# Patient Record
Sex: MALE | Race: WHITE | HISPANIC OR LATINO | Employment: STUDENT | URBAN - METROPOLITAN AREA
[De-identification: names, ages, dates, MRNs, and addresses within clinical notes are randomized per-mention and may not be internally consistent; named-entity substitution may affect disease eponyms.]

---
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
## 2020-01-02 ENCOUNTER — HOSPITAL ENCOUNTER (OUTPATIENT)
Dept: RADIOLOGY | Facility: HOSPITAL | Age: 20
Discharge: HOME | End: 2020-01-02
Attending: PHYSICIAN ASSISTANT
Payer: COMMERCIAL

## 2020-01-02 ENCOUNTER — OFFICE VISIT (OUTPATIENT)
Dept: SPORTS MEDICINE | Facility: CLINIC | Age: 20
End: 2020-01-02
Payer: COMMERCIAL

## 2020-01-02 ENCOUNTER — CLINICAL SUPPORT (OUTPATIENT)
Dept: REHABILITATION | Facility: HOSPITAL | Age: 20
End: 2020-01-02
Attending: ORTHOPAEDIC SURGERY
Payer: COMMERCIAL

## 2020-01-02 VITALS
DIASTOLIC BLOOD PRESSURE: 72 MMHG | HEART RATE: 71 BPM | HEIGHT: 70 IN | WEIGHT: 285 LBS | SYSTOLIC BLOOD PRESSURE: 117 MMHG | BODY MASS INDEX: 40.8 KG/M2

## 2020-01-02 DIAGNOSIS — Z98.890 S/P ACL RECONSTRUCTION: Primary | ICD-10-CM

## 2020-01-02 DIAGNOSIS — M25.562 ACUTE PAIN OF LEFT KNEE: Primary | ICD-10-CM

## 2020-01-02 DIAGNOSIS — M25.562 LEFT KNEE PAIN, UNSPECIFIED CHRONICITY: ICD-10-CM

## 2020-01-02 PROCEDURE — 97110 THERAPEUTIC EXERCISES: CPT | Performed by: PHYSICAL THERAPIST

## 2020-01-02 PROCEDURE — 99999 PR PBB SHADOW E&M-EST. PATIENT-LVL III: ICD-10-PCS | Mod: PBBFAC,,, | Performed by: PHYSICIAN ASSISTANT

## 2020-01-02 PROCEDURE — 99999 PR PBB SHADOW E&M-EST. PATIENT-LVL III: CPT | Mod: PBBFAC,,, | Performed by: PHYSICIAN ASSISTANT

## 2020-01-02 PROCEDURE — 73560 X-RAY EXAM OF KNEE 1 OR 2: CPT | Mod: TC,LT

## 2020-01-02 PROCEDURE — 73560 X-RAY EXAM OF KNEE 1 OR 2: CPT | Mod: 26,LT,, | Performed by: RADIOLOGY

## 2020-01-02 PROCEDURE — 99024 PR POST-OP FOLLOW-UP VISIT: ICD-10-PCS | Mod: S$GLB,,, | Performed by: PHYSICIAN ASSISTANT

## 2020-01-02 PROCEDURE — 99024 POSTOP FOLLOW-UP VISIT: CPT | Mod: S$GLB,,, | Performed by: PHYSICIAN ASSISTANT

## 2020-01-02 PROCEDURE — 73560 XR KNEE 1 OR 2 VIEW LEFT: ICD-10-PCS | Mod: 26,LT,, | Performed by: RADIOLOGY

## 2020-01-02 NOTE — PROGRESS NOTES
Physical Therapy Daily Treatment Note     Visit Date: 1/2/2020    Name: Ba Celestin  Clinic Number: [id number]    Therapy Diagnosis:  Pain left knee   Physician: Grant Membreno MD    PROCEDURES(S) PERFORMED:   1. Left  Arthroscopy, anterior cruciate ligament reconstruction 56879   2. Left  Extra-Articular ligament reconstruction (ALL), 17854  3.  Left  Arthroscopy, with meniscus repair (medial OR lateral) 25027, Lateral  4.  Left  Arthroscopy, knee, synovectomy, limited 21893    GRAFT SOURCE:  BPTB     MTF Medium cortical fibers and MTF semitendinosis allograft Looped 6 mm x 130mm (Fresh Frozen)     three Mitek Truespan meniscal sutures were applied in vertical and horizontal fashion to stabilize the tear       DATE OF PROCEDURE: 12/20/2019       Evaluation Date: 12/23/2019  Authorization Period Expiration: 12/31/2019  Plan of Care Certification Period: 8/23/2020  Visit # / Visits authorized: 3/ 1    Time In:   10:00  Time Out: 11:00  Total Billable Time: 45  minutes    Precautions: Standard + PHYSICAL THERAPY:  The patient should begin physical therapy on postoperative   day # 3 and will be advanced to outpatient therapy as soon as   Possible following discharge.  Weight bearing:toe touch weight bearing  left leg  Range of Motion:limited to -10 degrees extension and 90 degrees flexion     No CPM required due to procedure performed      to begin quad sets with a heel roll to obtain hyperextension, straight leg raise and heel slides with the heel supported in a closed-chain fashion     Immediate specific exercises should include:   Gait program should include the above stated weightbearing; in addition: protected gait following symptoms of pain and swelling     Immobilizer if present should be locked @ -10 degrees with gait and allowed to flex to 90 degrees at rest.       Subjective      Pt reports improvement in his pain level    he was compliant with home exercise program given last session.   Response to  previous treatment:good  Functional change: pt limited by healing constraints     Pain: 0/10 (decreased)  Location: left knee      Objective     PO 13    Measurements taken:  PROM: 0/0/70      Ba received therapeutic exercises to develop strength, endurance, ROM and flexibility for 45  minutes including:    Grade I-II patellar mobilization   HSS 5x:15  QS 2x10:10  LLR in brace 2x10:05   ALR in brace 2x10:05   Supine SLR AA 2x10 in brace  Self ROM k' flex 5x;15 EOT  PROM k' flex EOT 3xs  PROM k' flex EOT 3xs  supine ext hang 3'x2 0#    CP x 10'       Home Exercises Provided and Patient Education Provided     Education provided:   - reiterated WB'ing and ROM precautions     Written Home Exercises Provided: Patient instructed to cont prior HEP.  Exercises were reviewed and Ba was able to demonstrate them prior to the end of the session.  Ba demonstrated good  understanding of the education provided.     See EMR under hand out  for exercises provided prior visit.      Assessment     naomi Rx without increase in sxs, continued difficulty with quad activation despite pain level decreasing and inability to perform SLR       Ba is progressing well towards his goals.   Pt prognosis is Good.     Pt will continue to benefit from skilled outpatient physical therapy to address the deficits listed in the problem list box on initial evaluation, provide pt/family education and to maximize pt's level of independence in the home and community environment.     Pt's spiritual, cultural and educational needs considered and pt agreeable to plan of care and goals.    Anticipated barriers to physical therapy: none    Goals:     Plan     Continue with established Plan of Care towards PT goals with focus on decreasing pain, increasing ROM, strength, neuromuscular control and functional status       Sav Ibarra, PT

## 2020-01-02 NOTE — PROGRESS NOTES
Subjective:          Chief Complaint: Ba Celestin is a 19 y.o. male who had concerns including Pain of the Left Knee.    HPI   Patient presents to clinic for 2 week post op evaluation of left knee. Pain today is 0/10. Denies nausea, vomiting, fever, chills, CP, and SOB. He is no longer taking pain medication. He is currently ambulating with T-scope brace in place and set to 90 degrees. He is 25% WB with crutches. He has been attending formal PT at [facility] with Sav Ibarra. Patient is leaving to go back to college in a week.      DATE OF PROCEDURE: 12/20/2019     ATTENDING SURGEON: Surgeon(s) and Role:     * Grant Membreno MD - Primary     Assistants:  MD Celestino Villa PA-C        PREOPERATIVE DIAGNOSIS:  Left  Synovitis M65.9, Tear, Lateral meniscus, acute S83.289A and Anterior Cruciate Ligament Tear S83.510     POSTOPERATIVE DIAGNOSIS:   Left  Synovitis M65.9, Tear, Lateral meniscus, acute S83.289A and Anterior Cruciate Ligament Tear S83.510     PROCEDURES(S) PERFORMED:   1. Left  Arthroscopy, anterior cruciate ligament reconstruction 95501   2. Left  Extra-Articular ligament reconstruction (ALL), 10066  3.  Left  Arthroscopy, with meniscus repair (medial OR lateral) 32061, Lateral  4.  Left  Arthroscopy, knee, synovectomy, limited 09516    Review of Systems   Constitution: Negative. Negative for chills, fever, weight gain and weight loss.   HENT: Negative for congestion and sore throat.    Eyes: Negative for blurred vision and double vision.   Cardiovascular: Negative for chest pain, leg swelling and palpitations.   Respiratory: Negative for cough and shortness of breath.    Hematologic/Lymphatic: Does not bruise/bleed easily.   Skin: Negative for itching, poor wound healing and rash.   Musculoskeletal: Positive for joint pain, joint swelling and stiffness. Negative for back pain, muscle weakness and myalgias.   Gastrointestinal: Negative for abdominal pain, constipation, diarrhea,  nausea and vomiting.   Genitourinary: Negative.  Negative for frequency and hematuria.   Neurological: Negative for dizziness, headaches, numbness, paresthesias and sensory change.   Psychiatric/Behavioral: Negative for altered mental status and depression. The patient is not nervous/anxious.    Allergic/Immunologic: Negative for hives.       Pain Related Questions  Over the past 3 days, what was your average pain during activity? (I.e. running, jogging, walking, climbing stairs, getting dressed, ect.): 4  Over the past 3 days, what was your highest pain level?: 4  Over the past 3 days, what was your lowest pain level? : 0    Other  Was the patient's HEIGHT measured or patient reported?: Patient Reported  Was the patient's WEIGHT measured or patient reported?: Measured      Objective:        General: Ba is well-developed, well-nourished, appears stated age, in no acute distress, alert and oriented to time, place and person.     General    Vitals reviewed.  Constitutional: He is oriented to person, place, and time. He appears well-developed and well-nourished. No distress.   Neck: Normal range of motion.   Cardiovascular: Normal rate and regular rhythm.    Pulmonary/Chest: Effort normal. No respiratory distress.   Neurological: He is alert and oriented to person, place, and time.   Psychiatric: He has a normal mood and affect. His behavior is normal. Thought content normal.     General Musculoskeletal Exam   Gait: antalgic and abnormal         Left Knee Exam     Inspection   Erythema: absent  Scars: present  Swelling: present  Effusion: present  Deformity: absent  Bruising: absent    Tenderness   The patient is experiencing no tenderness.     Range of Motion   Extension:  0 normal   Flexion:  50 abnormal     Other   Sensation: normal    Comments:  Sutures tags trimmed. No signs of infection or necrosis. No purulent drainage. NVI.    RADIOGRAPHS:  Left knee:  FINDINGS:  Postoperative changes are now identified  relating to an interval left anterior cruciate ligament reconstruction procedure.  Osseous structures are otherwise unremarkable.  No evidence of recent fracture or lytic destructive process.  No unusual postoperative findings or significant detrimental interval change since the preoperative examination of 12/18/2019 appreciated.      Assessment:       Encounter Diagnoses   Name Primary?    Left knee pain, unspecified chronicity     S/P ACL reconstruction Yes          Plan:       1. Provided patient with operative note.  2. Removed portal suture and suture tags trimmed. NO signs of infection.   3. Continue  mg once a day.  4 Continue PT per protocol.  5. RTC in 1 weeks with Dr. Grant Membreno for 3week post op appt. Prior to patient leaving to go back to school.   6. PHYSICAL THERAPY:  The patient should begin physical therapy on postoperative   day # 3 and will be advanced to outpatient therapy as soon as   Possible following discharge.  Weight bearing:toe touch weight bearing  left leg  Range of Motion:limited to -10 degrees extension and 90 degrees flexion                    Patient questionnaires may have been collected.

## 2020-01-07 ENCOUNTER — CLINICAL SUPPORT (OUTPATIENT)
Dept: REHABILITATION | Facility: HOSPITAL | Age: 20
End: 2020-01-07
Attending: ORTHOPAEDIC SURGERY
Payer: COMMERCIAL

## 2020-01-07 DIAGNOSIS — M25.562 ACUTE PAIN OF LEFT KNEE: Primary | ICD-10-CM

## 2020-01-07 PROCEDURE — 97110 THERAPEUTIC EXERCISES: CPT | Performed by: PHYSICAL THERAPIST

## 2020-01-07 NOTE — PROGRESS NOTES
Physical Therapy Daily Treatment Note     Visit Date: 1/7/2020    Name: Ba Celestin  Clinic Number: [id number]    Therapy Diagnosis:  Pain left knee   Physician: Grant Membreno MD    PROCEDURES(S) PERFORMED:   1. Left  Arthroscopy, anterior cruciate ligament reconstruction 71533   2. Left  Extra-Articular ligament reconstruction (ALL), 92562  3.  Left  Arthroscopy, with meniscus repair (medial OR lateral) 54288, Lateral  4.  Left  Arthroscopy, knee, synovectomy, limited 07055    GRAFT SOURCE:  BPTB     MTF Medium cortical fibers and MTF semitendinosis allograft Looped 6 mm x 130mm (Fresh Frozen)     three Mitek Truespan meniscal sutures were applied in vertical and horizontal fashion to stabilize the tear       DATE OF PROCEDURE: 12/20/2019       Evaluation Date: 12/23/2019  Authorization Period Expiration: 12/31/2019  Plan of Care Certification Period: 8/23/2020  Visit # / Visits authorized: 4/ 1    Time In:   9:00  Time Out: 10:00  Total Billable Time: 30  minutes    Precautions: Standard + PHYSICAL THERAPY:  The patient should begin physical therapy on postoperative   day # 3 and will be advanced to outpatient therapy as soon as   Possible following discharge.  Weight bearing:toe touch weight bearing  left leg  Range of Motion:limited to -10 degrees extension and 90 degrees flexion     No CPM required due to procedure performed      to begin quad sets with a heel roll to obtain hyperextension, straight leg raise and heel slides with the heel supported in a closed-chain fashion     Immediate specific exercises should include:   Gait program should include the above stated weightbearing; in addition: protected gait following symptoms of pain and swelling     Immobilizer if present should be locked @ -10 degrees with gait and allowed to flex to 90 degrees at rest.       Subjective      Pt reports continued difficulty with ROM k' flexion     he was compliant with home exercise program given last session.    Response to previous treatment:good  Functional change: pt limited by healing constraints     Pain: 0/10 (decreased)  Location: left knee      Objective     PO 18    Measurements taken:  PROM: 2/0/75 (increased)    Ba received therapeutic exercises to develop strength, endurance, ROM and flexibility for 30  minutes including:    Grade I-II patellar mobilization   HSS 5x:15  QS 2x10:10  LLR in brace 2x10:05   ALR in brace 2x10:05   Supine SLR 2x10:05 in brace  Self ROM k' flex 5x;15 EOT  PROM k' flex EOT 3xs  supine k' flex EOT hang  3'x2  supine ext hang 3'x2 0#    CP x 10'       Home Exercises Provided and Patient Education Provided     Education provided:   - reiterated WB'ing and ROM precautions     Written Home Exercises Provided: Patient instructed to cont prior HEP.  Exercises were reviewed and Ba was able to demonstrate them prior to the end of the session.  Ba demonstrated good  understanding of the education provided.     See EMR under hand out  for exercises provided prior visit.      Assessment     naomi Rx without increase in sxs, slight improvement in quad activation and PROM k' flex        Ba is progressing well towards his goals.   Pt prognosis is Good.     Pt will continue to benefit from skilled outpatient physical therapy to address the deficits listed in the problem list box on initial evaluation, provide pt/family education and to maximize pt's level of independence in the home and community environment.     Pt's spiritual, cultural and educational needs considered and pt agreeable to plan of care and goals.    Anticipated barriers to physical therapy: none    Goals:     Plan     Continue with established Plan of Care towards PT goals with focus on decreasing pain, increasing ROM, strength, neuromuscular control and functional status       Sav Ibarra, PT

## 2020-01-08 ENCOUNTER — OFFICE VISIT (OUTPATIENT)
Dept: SPORTS MEDICINE | Facility: CLINIC | Age: 20
End: 2020-01-08
Payer: COMMERCIAL

## 2020-01-08 VITALS — HEART RATE: 57 BPM | DIASTOLIC BLOOD PRESSURE: 71 MMHG | SYSTOLIC BLOOD PRESSURE: 127 MMHG

## 2020-01-08 DIAGNOSIS — M25.562 ACUTE PAIN OF LEFT KNEE: ICD-10-CM

## 2020-01-08 DIAGNOSIS — Z98.890 STATUS POST RECONSTRUCTION OF ANTERIOR CRUCIATE LIGAMENT: ICD-10-CM

## 2020-01-08 DIAGNOSIS — S83.512D RUPTURE OF ANTERIOR CRUCIATE LIGAMENT OF LEFT KNEE, SUBSEQUENT ENCOUNTER: Primary | ICD-10-CM

## 2020-01-08 PROCEDURE — 99024 POSTOP FOLLOW-UP VISIT: CPT | Mod: S$GLB,,, | Performed by: ORTHOPAEDIC SURGERY

## 2020-01-08 PROCEDURE — 99999 PR PBB SHADOW E&M-EST. PATIENT-LVL III: CPT | Mod: PBBFAC,,, | Performed by: ORTHOPAEDIC SURGERY

## 2020-01-08 PROCEDURE — 99999 PR PBB SHADOW E&M-EST. PATIENT-LVL III: ICD-10-PCS | Mod: PBBFAC,,, | Performed by: ORTHOPAEDIC SURGERY

## 2020-01-08 PROCEDURE — 99024 PR POST-OP FOLLOW-UP VISIT: ICD-10-PCS | Mod: S$GLB,,, | Performed by: ORTHOPAEDIC SURGERY

## 2020-01-08 NOTE — PATIENT INSTRUCTIONS
25 % PWB x 2 weeks then 50% PWB x 1 week then WBAT at 4-6 weeks after surgery    ROM:  AAROM /PROM 0-90 degrees now then at 4 weeks 100 degrees and 6 weeks 120 degrees as tolerated  Avoid forced flexion for 3 months    Discontinue immobilizer at 6 weeks    No running or jumping for 3-4 months    Low impact x 4 months    Advance CORE program at 4-6 weeks    Take celebrex 200 mg BID and one Aspirin per day  Use pain medication as needed with therapy

## 2020-01-09 ENCOUNTER — CLINICAL SUPPORT (OUTPATIENT)
Dept: REHABILITATION | Facility: HOSPITAL | Age: 20
End: 2020-01-09
Attending: ORTHOPAEDIC SURGERY
Payer: COMMERCIAL

## 2020-01-09 DIAGNOSIS — M25.562 ACUTE PAIN OF LEFT KNEE: Primary | ICD-10-CM

## 2020-01-09 PROCEDURE — 97110 THERAPEUTIC EXERCISES: CPT | Performed by: PHYSICAL THERAPIST

## 2020-01-09 NOTE — PROGRESS NOTES
Physical Therapy Daily Treatment Note     Visit Date: 1/9/2020    Name: Ba Celestin  Clinic Number: [id number]    Therapy Diagnosis:  Pain left knee   Physician: Grant Membreno MD    PROCEDURES(S) PERFORMED:   1. Left  Arthroscopy, anterior cruciate ligament reconstruction 65431   2. Left  Extra-Articular ligament reconstruction (ALL), 28455  3.  Left  Arthroscopy, with meniscus repair (medial OR lateral) 27233, Lateral  4.  Left  Arthroscopy, knee, synovectomy, limited 31961    GRAFT SOURCE:  BPTB     MTF Medium cortical fibers and MTF semitendinosis allograft Looped 6 mm x 130mm (Fresh Frozen)     three Mitek Truespan meniscal sutures were applied in vertical and horizontal fashion to stabilize the tear       DATE OF PROCEDURE: 12/20/2019       Evaluation Date: 12/23/2019  Authorization Period Expiration: 12/31/2019  Plan of Care Certification Period: 8/23/2020  Visit # / Visits authorized: 5/ 1    Time In:   11:00  Time Out: 12:00  Total Billable Time: 45  minutes    Precautions: Standard + PHYSICAL THERAPY:  The patient should begin physical therapy on postoperative   day # 3 and will be advanced to outpatient therapy as soon as   Possible following discharge.  Weight bearing:toe touch weight bearing  left leg  Range of Motion:limited to -10 degrees extension and 90 degrees flexion     No CPM required due to procedure performed      to begin quad sets with a heel roll to obtain hyperextension, straight leg raise and heel slides with the heel supported in a closed-chain fashion     Immediate specific exercises should include:   Gait program should include the above stated weightbearing; in addition: protected gait following symptoms of pain and swelling     Immobilizer if present should be locked @ -10 degrees with gait and allowed to flex to 90 degrees at rest.       Subjective      Pt reports that when he had his follow up with Dr. Membreno, he had a significant amount of pain just hanging the leg in k ' flex  over EOT    he was compliant with home exercise program given last session.   Response to previous treatment:good  Functional change: pt limited by healing constraints     Pain: 0/10 at rest this AM  Location: left knee      Objective     PO 20    Measurements taken:  PROM: 2/0/75 (increased)    Ba received therapeutic exercises to develop strength, endurance, ROM and flexibility for 45  minutes including:    Grade II-III patellar mobilization   HSS 5x:15  QS + NMES x 10'   Supine SLR x 10' + NMES   Self ROM k' flex 5x;15 EOT  PROM k' flex EOT 3xs  prone ext hang 3'x2 0#  PROM k' ext 3xs    CP x 10'       Home Exercises Provided and Patient Education Provided     Education provided:   - reiterated WB'ing and ROM precautions     Written Home Exercises Provided: Patient instructed to cont prior HEP.  Exercises were reviewed and Ba was able to demonstrate them prior to the end of the session.  Ba demonstrated good  understanding of the education provided.     See EMR under hand out  for exercises provided prior visit.      Assessment     naomi Rx without increase in sxs   Able to lift LE during SLR with brace but demonstrates quad lag even in brace   Pt also continues to have limitation in k' flex ROM     Ba is progressing well towards his goals.   Pt prognosis is Good.     Pt will continue to benefit from skilled outpatient physical therapy to address the deficits listed in the problem list box on initial evaluation, provide pt/family education and to maximize pt's level of independence in the home and community environment.     Pt's spiritual, cultural and educational needs considered and pt agreeable to plan of care and goals.    Anticipated barriers to physical therapy: none    Goals:     Short-Term Goals: 6  Weeks (moving toward achievement)  - The patient will be independent with initial home exercise program.  - The patient is independent with donning/doffing brace to protect tissue healing.  -  The patient will be independent amb with crutches on level tile for household distances.  - The patient will increase ROM by 15 degrees to perform ambulation and bathing and hygiene with pain < 0/10.  - The patient will increase strength by 1/2 muscle grade to perform ambulation and bathing and hygiene with pain < 0/10.    Long-Term Goals: 36 weeks  - The patient will be independent with home exercise program and symptom management.  - The patient will be independent amb with no assistive device on all surfaces for community distances.  - The patient will increase ROM = to uninvolved knee to perform ambulation, toileting, dressing and recreation/leisure activities  with pain < 0/10.  - The patient will increase strength = to uninvolved knee  to perform ambulation, toileting, dressing and recreation/leisure activities   with pain < 0/10.      Plan     DC secondary to pt returning to college, PT to contact ATC at school to discuss rehab progression, pt to return to formal care at our facility at either spring break or during summer     Sav Ibarra, PT

## 2020-02-05 ENCOUNTER — TELEPHONE (OUTPATIENT)
Dept: SPORTS MEDICINE | Facility: CLINIC | Age: 20
End: 2020-02-05

## 2020-02-05 NOTE — TELEPHONE ENCOUNTER
Spoke to the patient and rescheduled appt to 2/10 at 8:15a  ----- Message from Kimberly Dao sent at 2/5/2020  4:46 PM CST -----  Contact: self  Pt states he was not able to have his MRI completed today Pt ask for a call to reschedule his MRI and his post op appointment     Contact info  [phone]

## 2020-02-05 NOTE — TELEPHONE ENCOUNTER
Spoke to the patient. Schedule an appt for 2/10 at 9:45a  ----- Message from Marie Hanley sent at 2/5/2020 12:46 PM CST -----  Contact: pt   Please call pt at [phone]     Patient has re-injured his left knee and need an urgent appt    Patient is available on Monday 02/10/20    Thank you

## 2020-02-07 NOTE — PROGRESS NOTES
Subjective:          Chief Complaint: Ba Celestin is a 19 y.o. male who had concerns including Pain of the Left Knee.    HPI   Ba Celestin is a 19 y.o. male is here for left knee evaluation.  He left ACL reconstruction, ALL reconstruction, and meniscus repair 12/2019.  He was doing well until 1 week ago when he slipped on ice while back at school in [place].  His right leg slipped and he fell onto a flexed left knee.  He noticed immediate pain, swelling, effusion, and ecchymoses.  He was seen at ED in [place] at the time where xrays were performed.  He was been in his HKB locked in extension and NWB since.    DATE OF PROCEDURE: 12/20/2019     ATTENDING SURGEON: Surgeon(s) and Role:     * Grant Membreno MD - Primary     Assistants:  MD Celestino Villa PA-C        PREOPERATIVE DIAGNOSIS:  Left  Synovitis M65.9, Tear, Lateral meniscus, acute S83.289A and Anterior Cruciate Ligament Tear S83.510     POSTOPERATIVE DIAGNOSIS:   Left  Synovitis M65.9, Tear, Lateral meniscus, acute S83.289A and Anterior Cruciate Ligament Tear S83.510     PROCEDURES(S) PERFORMED:   1. Left  Arthroscopy, anterior cruciate ligament reconstruction 19844   2. Left  Extra-Articular ligament reconstruction (ALL), 18860  3.  Left  Arthroscopy, with meniscus repair (medial OR lateral) 05404, Lateral  4.  Left  Arthroscopy, knee, synovectomy, limited 41986    Review of Systems   Constitution: Negative. Negative for chills, fever, weight gain and weight loss.   HENT: Negative for congestion and sore throat.    Eyes: Negative for blurred vision and double vision.   Cardiovascular: Negative for chest pain, leg swelling and palpitations.   Respiratory: Negative for cough and shortness of breath.    Hematologic/Lymphatic: Does not bruise/bleed easily.   Skin: Positive for color change. Negative for itching, poor wound healing and rash.   Musculoskeletal: Positive for joint pain, joint swelling and stiffness. Negative for back pain,  muscle weakness and myalgias.   Gastrointestinal: Negative for abdominal pain, constipation, diarrhea, nausea and vomiting.   Genitourinary: Negative.  Negative for frequency and hematuria.   Neurological: Negative for dizziness, headaches, numbness, paresthesias and sensory change.   Psychiatric/Behavioral: Negative for altered mental status and depression. The patient is not nervous/anxious.    Allergic/Immunologic: Negative for hives.       Pain Related Questions  Over the past 3 days, what was your average pain during activity? (I.e. running, jogging, walking, climbing stairs, getting dressed, ect.): 1  Over the past 3 days, what was your highest pain level?: 2  Over the past 3 days, what was your lowest pain level? : 0    Other  How many nights a week are you awakened by your affected body part?: 0  Was the patient's HEIGHT measured or patient reported?: Patient Reported  Was the patient's WEIGHT measured or patient reported?: Measured      Objective:        General: Ba is well-developed, well-nourished, appears stated age, in no acute distress, alert and oriented to time, place and person.     General    Vitals reviewed.  Constitutional: He is oriented to person, place, and time. He appears well-developed and well-nourished. No distress.   HENT:   Mouth/Throat: No oropharyngeal exudate.   Eyes: Right eye exhibits no discharge. Left eye exhibits no discharge.   Neck: Normal range of motion.   Cardiovascular: Normal rate and regular rhythm.    Pulmonary/Chest: Effort normal and breath sounds normal. No respiratory distress.   Neurological: He is alert and oriented to person, place, and time. He has normal reflexes. No cranial nerve deficit. Coordination normal.   Psychiatric: He has a normal mood and affect. His behavior is normal. Judgment and thought content normal.     General Musculoskeletal Exam   Gait: antalgic and abnormal       Right Knee Exam     Inspection   Erythema: absent  Scars:  absent  Swelling: absent  Effusion: absent  Deformity: absent  Bruising: absent    Tenderness   The patient is experiencing no tenderness.     Range of Motion   Extension: 0   Flexion: 140     Tests   Meniscus   Lynn:  Medial - negative Lateral - negative  Ligament Examination Lachman: normal (-1 to 2mm) PCL-Posterior Drawer: normal (0 to 2mm)     MCL - Valgus: normal (0 to 2mm)  LCL - Varus: normalPivot Shift: normal (Equal)Reverse Pivot Shift: normal (Equal)Dial Test at 30 degrees: normal (< 5 degrees)Dial Test at 90 degrees: normal (< 5 degrees)  Posterior Sag Test: negative  Posterolateral Corner: unstable (>15 degrees difference)  Patella   Patellar apprehension: negative  Passive Patellar Tilt: neutral  Patellar Tracking: normal  Patellar Glide (quadrants): Lateral - 1   Medial - 2  Q-Angle at 90 degrees: normal  Patellar Grind: negative  J-Sign: none    Other   Meniscal Cyst: absent  Popliteal (Baker's) Cyst: absent  Sensation: normal    Left Knee Exam     Inspection   Erythema: absent  Scars: present  Swelling: present  Effusion: present  Deformity: absent  Bruising: present    Tenderness   The patient tender to palpation of the patellar tendon and patella.    Range of Motion   Extension:  0 (unable to actively extend) normal   Flexion:  40 abnormal     Tests   Meniscus   Lynn:  Medial - negative Lateral - negative  Stability Lachman: abnormal PCL-Posterior Drawer: normal (0 to 2mm)  MCL - Valgus: normal (0 to 2mm)  LCL - Varus: normal (0 to 2mm)Pivot Shift: normal (Equal)Reverse Pivot Shift: normal (Equal)Dial Test at 30 degrees: normal (< 5 degrees)Dial Test at 90 degrees: normal (< 5 degrees)  Posterior Sag Test: negative  Posterolateral Corner: unstable (>15 degrees difference)  Patella   Patellar apprehension: positive  Passive Patellar Tilt: neutral  Patellar Tracking: normal  Patellar Glide (Quadrants): Lateral - 1 Medial - 2  Q-Angle at 90 degrees: normal  Patellar Grind: negative  J-Sign: J  sign absent    Other   Meniscal Cyst: absent  Popliteal (Baker's) Cyst: absent  Sensation: normal    Comments:  Unable to perform SLR.  Cannot actively extend left knee.  Severe guarding    Right Hip Exam     Tests   Enid: negative  Left Hip Exam     Tests   Enid: negative          Muscle Strength   Right Lower Extremity   Hip Abduction: 5/5   Quadriceps:  4/5   Hamstrin/5   Left Lower Extremity   Hip Abduction: 5/5   Quadriceps:  5/5 and 0/5   Hamstrin/5     Reflexes     Left Side  Quadriceps:  2+  Achilles:  2+    Right Side   Quadriceps:  2+  Achilles:  2+    Vascular Exam     Right Pulses  Dorsalis Pedis:      2+  Posterior Tibial:      2+        Left Pulses  Dorsalis Pedis:      2+  Posterior Tibial:      2+          RADIOGRAPHS:  Left knee:  FINDINGS:  Postoperative changes are now identified relating to an interval left anterior cruciate ligament reconstruction procedure.  Osseous structures are otherwise unremarkable.    No evidence of recent fracture or lytic destructive process.  No unusual postoperative findings or significant detrimental interval change since the preoperative examination of 2019 appreciated.    Bilateral knee series:  Left knee patella gerson with swelling in the patellar tendon region; no fracture noted      Assessment:       Encounter Diagnoses   Name Primary?    Left knee pain, unspecified chronicity Yes    Status post reconstruction of anterior cruciate ligament     Acute pain of left knee     Patellar tendon rupture, left, initial encounter           Plan:       1. IKDC, SF-12 and KOOS was not filled out today in clinic.     RTC in 1 weeks with Dr. Grant Membreno for post-operative follow-up.  Patient will not fill out IKDC, SF-12 and KOOS on return.    2. We reviewed with Ba today, the pathology and natural history of his diagnosis. We have discussed a variety of treatment options including medications, physical therapy and other alternative treatments. I also  explained the indications, risks and benefits of surgery. After discussion, Ba decided to proceed with surgery. The decision was made to go forward with :  1. Left knee patellar tendon repair (drill bit set and [employer] anchors)  2. Left knee arthroscopic debridement  3. Evaluation ACL with possible repair if required  4. Left knee arthroscopic medial meniscal repair  5. Allopatch HD dermal graft vs. Hamstring allograft if required    The details of the surgical procedure were explained, including the location of probable incisions and a description of likely hardware and/or grafts to be used.  The patient understands the likely convalescence after surgery.  Also, we have thoroughly discussed the risks, benefits and alternatives to surgery, including, but not limited to, the risk of infection, joint stiffness, blood clot (including DVT and/or pulmonary embolus), neurologic and vascular injury.  It was explained that, if tissue has been repaired or reconstructed, there is a chance of failure, which may require further management.      All of the patient's questions were answered and informed consent was obtained. The patient will contact us if they have any questions or concerns in the interim.                Patient questionnaires may have been collected.

## 2020-02-10 ENCOUNTER — HOSPITAL ENCOUNTER (OUTPATIENT)
Dept: RADIOLOGY | Facility: HOSPITAL | Age: 20
Discharge: HOME | End: 2020-02-10
Attending: STUDENT IN AN ORGANIZED HEALTH CARE EDUCATION/TRAINING PROGRAM
Payer: COMMERCIAL

## 2020-02-10 ENCOUNTER — ANESTHESIA EVENT (OUTPATIENT)
Dept: SURGERY | Facility: HOSPITAL | Age: 20
End: 2020-02-10
Payer: COMMERCIAL

## 2020-02-10 ENCOUNTER — HOSPITAL ENCOUNTER (OUTPATIENT)
Dept: RADIOLOGY | Facility: HOSPITAL | Age: 20
Discharge: HOME | End: 2020-02-10
Attending: ORTHOPAEDIC SURGERY
Payer: COMMERCIAL

## 2020-02-10 ENCOUNTER — OFFICE VISIT (OUTPATIENT)
Dept: SPORTS MEDICINE | Facility: CLINIC | Age: 20
End: 2020-02-10
Payer: COMMERCIAL

## 2020-02-10 VITALS
SYSTOLIC BLOOD PRESSURE: 129 MMHG | HEIGHT: 70 IN | HEART RATE: 74 BPM | DIASTOLIC BLOOD PRESSURE: 71 MMHG | BODY MASS INDEX: 38.65 KG/M2 | WEIGHT: 270 LBS

## 2020-02-10 DIAGNOSIS — Z98.890 STATUS POST RECONSTRUCTION OF ANTERIOR CRUCIATE LIGAMENT: ICD-10-CM

## 2020-02-10 DIAGNOSIS — M23.92 INTERNAL DERANGEMENT OF LEFT KNEE: ICD-10-CM

## 2020-02-10 DIAGNOSIS — M25.562 ACUTE PAIN OF LEFT KNEE: ICD-10-CM

## 2020-02-10 DIAGNOSIS — M25.562 LEFT KNEE PAIN, UNSPECIFIED CHRONICITY: ICD-10-CM

## 2020-02-10 DIAGNOSIS — S86.812A PATELLAR TENDON RUPTURE, LEFT, INITIAL ENCOUNTER: ICD-10-CM

## 2020-02-10 DIAGNOSIS — M25.562 LEFT KNEE PAIN, UNSPECIFIED CHRONICITY: Primary | ICD-10-CM

## 2020-02-10 DIAGNOSIS — S86.812A PATELLAR TENDON RUPTURE, LEFT, INITIAL ENCOUNTER: Primary | ICD-10-CM

## 2020-02-10 PROCEDURE — 99999 PR PBB SHADOW E&M-EST. PATIENT-LVL IV: CPT | Mod: PBBFAC,,, | Performed by: ORTHOPAEDIC SURGERY

## 2020-02-10 PROCEDURE — 99214 OFFICE O/P EST MOD 30 MIN: CPT | Mod: 24,S$GLB,, | Performed by: ORTHOPAEDIC SURGERY

## 2020-02-10 PROCEDURE — 3008F BODY MASS INDEX DOCD: CPT | Mod: CPTII,S$GLB,, | Performed by: ORTHOPAEDIC SURGERY

## 2020-02-10 PROCEDURE — 73564 X-RAY EXAM KNEE 4 OR MORE: CPT | Mod: TC,50

## 2020-02-10 PROCEDURE — 73564 X-RAY EXAM KNEE 4 OR MORE: CPT | Mod: 26,,, | Performed by: RADIOLOGY

## 2020-02-10 PROCEDURE — 99214 PR OFFICE/OUTPT VISIT, EST, LEVL IV, 30-39 MIN: ICD-10-PCS | Mod: 24,S$GLB,, | Performed by: ORTHOPAEDIC SURGERY

## 2020-02-10 PROCEDURE — 73564 XR KNEE ORTHO BILAT WITH FLEXION: ICD-10-PCS | Mod: 26,,, | Performed by: RADIOLOGY

## 2020-02-10 PROCEDURE — 3008F PR BODY MASS INDEX (BMI) DOCUMENTED: ICD-10-PCS | Mod: CPTII,S$GLB,, | Performed by: ORTHOPAEDIC SURGERY

## 2020-02-10 PROCEDURE — 73721 MRI JNT OF LWR EXTRE W/O DYE: CPT | Mod: TC,LT

## 2020-02-10 PROCEDURE — 99999 PR PBB SHADOW E&M-EST. PATIENT-LVL IV: ICD-10-PCS | Mod: PBBFAC,,, | Performed by: ORTHOPAEDIC SURGERY

## 2020-02-10 PROCEDURE — 73721 MRI KNEE WITHOUT CONTRAST LEFT: ICD-10-PCS | Mod: 26,LT,, | Performed by: RADIOLOGY

## 2020-02-10 PROCEDURE — 73721 MRI JNT OF LWR EXTRE W/O DYE: CPT | Mod: 26,LT,, | Performed by: RADIOLOGY

## 2020-02-10 RX ORDER — PROMETHAZINE HYDROCHLORIDE 25 MG/1
25 TABLET ORAL EVERY 4 HOURS PRN
Qty: 42 TABLET | Refills: 0 | Status: SHIPPED | OUTPATIENT
Start: 2020-02-10 | End: 2021-11-08

## 2020-02-10 RX ORDER — OXYCODONE AND ACETAMINOPHEN 10; 325 MG/1; MG/1
1 TABLET ORAL EVERY 4 HOURS PRN
Qty: 42 TABLET | Refills: 0 | Status: SHIPPED | OUTPATIENT
Start: 2020-02-10 | End: 2021-11-08

## 2020-02-10 NOTE — ANESTHESIA PAT ROS NOTE
02/10/2020  Ba Celestin is a 19 y.o., male.      Pre-op Assessment     I have reviewed the Nursing Notes.   I have reviewed the Medications.     Review of Systems  Anesthesia Hx:  No problems with previous Anesthesia 12/20/19 ACL  Airway/Jaw/Neck:  Airway Findings: Mouth Opening: Normal Tongue: Normal  General Airway Assessment: Average  Mallampati: II  Improves to I with phonation.  TM Distance: Normal, at least 6 cm  Jaw/Neck Findings:  Neck ROM: Normal ROM          Method of Intubation: Direct laryngoscopy Mask Ventilation: Easy Intubated: Postinduction Blade: Li #2 Airway Device Size: 7.5 Placement Verified By: Capnometry Complicating Factors: None Findings Post-Intubation: Bilateral breath sounds Secured at: Lips Complications: None    Social:  Non-Smoker, No Alcohol Use    Hematology/Oncology:  Hematology Normal        Cardiovascular:  Cardiovascular Normal Exercise tolerance: good   Denies Angina.  Functional Capacity good / => 4 METS    Pulmonary:   Asthma mild Denies Shortness of breath.  Denies Recent URI.    Renal/:  Renal/ Normal     Hepatic/GI:  Hepatic/GI Normal    Musculoskeletal:   ACL 12/2019   Endocrine:  Endocrine Normal    Psych:  Psychiatric Normal              Anesthesia Assessment: Preoperative EQUATION    Planned Procedure: Procedure(s) (LRB):  REPAIR, TENDON, PATELLAR (Left)  Requested Anesthesia Type:General  Surgeon: Grant Membreno MD  Service: Orthopedics  Known or anticipated Date of Surgery:2/11/2020    Surgeon notes: reviewed    Electronic QUestionnaire Assessment completed via nurse interview with patient.        Triage considerations:     The patient has no apparent active cardiac condition (No unstable coronary Syndrome such as severe unstable angina or recent [<1 month] myocardial infarction, decompensated CHF, severe valvular   disease or significant  arrhythmia)    Previous anesthesia records:GETA, LMA General, Nerve block for post-op pain and No problems             Instructions given. (See in Nurse's note)    Optimization:    Patient  has previously scheduled Medical Appointment:    Navigation:             Straight Line to surgery.               No tests, anesthesia preop clinic visit, or consult required.

## 2020-02-11 ENCOUNTER — ANESTHESIA (OUTPATIENT)
Dept: SURGERY | Facility: HOSPITAL | Age: 20
End: 2020-02-11
Payer: COMMERCIAL

## 2020-02-11 ENCOUNTER — HOSPITAL ENCOUNTER (OUTPATIENT)
Facility: HOSPITAL | Age: 20
Discharge: HOME | End: 2020-02-11
Attending: ORTHOPAEDIC SURGERY | Admitting: ORTHOPAEDIC SURGERY
Payer: COMMERCIAL

## 2020-02-11 VITALS
BODY MASS INDEX: 39.37 KG/M2 | OXYGEN SATURATION: 99 % | SYSTOLIC BLOOD PRESSURE: 138 MMHG | HEART RATE: 84 BPM | DIASTOLIC BLOOD PRESSURE: 60 MMHG | HEIGHT: 70 IN | WEIGHT: 275 LBS | RESPIRATION RATE: 21 BRPM | TEMPERATURE: 98 F

## 2020-02-11 DIAGNOSIS — S86.812A PATELLAR TENDON RUPTURE, LEFT, INITIAL ENCOUNTER: ICD-10-CM

## 2020-02-11 PROCEDURE — 25000003 PHARM REV CODE 250: Performed by: NURSE ANESTHETIST, CERTIFIED REGISTERED

## 2020-02-11 PROCEDURE — 76942 ECHO GUIDE FOR BIOPSY: CPT | Mod: 59 | Performed by: ANESTHESIOLOGY

## 2020-02-11 PROCEDURE — 25000003 PHARM REV CODE 250: Performed by: ANESTHESIOLOGY

## 2020-02-11 PROCEDURE — 63600175 PHARM REV CODE 636 W HCPCS: Performed by: NURSE ANESTHETIST, CERTIFIED REGISTERED

## 2020-02-11 PROCEDURE — 94761 N-INVAS EAR/PLS OXIMETRY MLT: CPT

## 2020-02-11 PROCEDURE — 01400 ANES OPN/ARTHRS KNEE JT NOS: CPT | Performed by: ORTHOPAEDIC SURGERY

## 2020-02-11 PROCEDURE — 36000711: Performed by: ORTHOPAEDIC SURGERY

## 2020-02-11 PROCEDURE — 29877 ARTHRS KNEE SURG DBRDMT/SHVG: CPT | Mod: 79,LT,, | Performed by: ORTHOPAEDIC SURGERY

## 2020-02-11 PROCEDURE — 63600175 PHARM REV CODE 636 W HCPCS: Performed by: ORTHOPAEDIC SURGERY

## 2020-02-11 PROCEDURE — 76942 PR U/S GUIDANCE FOR NEEDLE GUIDANCE: ICD-10-PCS | Mod: 26,,, | Performed by: ANESTHESIOLOGY

## 2020-02-11 PROCEDURE — 27800903 OPTIME MED/SURG SUP & DEVICES OTHER IMPLANTS: Performed by: ORTHOPAEDIC SURGERY

## 2020-02-11 PROCEDURE — 71000039 HC RECOVERY, EACH ADD'L HOUR: Performed by: ORTHOPAEDIC SURGERY

## 2020-02-11 PROCEDURE — 71000033 HC RECOVERY, INTIAL HOUR: Performed by: ORTHOPAEDIC SURGERY

## 2020-02-11 PROCEDURE — 36000710: Performed by: ORTHOPAEDIC SURGERY

## 2020-02-11 PROCEDURE — S0028 INJECTION, FAMOTIDINE, 20 MG: HCPCS | Performed by: NURSE ANESTHETIST, CERTIFIED REGISTERED

## 2020-02-11 PROCEDURE — 64448 NJX AA&/STRD FEM NRV NFS IMG: CPT | Performed by: ANESTHESIOLOGY

## 2020-02-11 PROCEDURE — D9220A PRA ANESTHESIA: Mod: ANES,,, | Performed by: ANESTHESIOLOGY

## 2020-02-11 PROCEDURE — 63600175 PHARM REV CODE 636 W HCPCS: Performed by: ANESTHESIOLOGY

## 2020-02-11 PROCEDURE — 25000003 PHARM REV CODE 250: Performed by: ORTHOPAEDIC SURGERY

## 2020-02-11 PROCEDURE — 27380 REPAIR OF KNEECAP TENDON: CPT | Mod: 79,51,22,LT | Performed by: ORTHOPAEDIC SURGERY

## 2020-02-11 PROCEDURE — 63600175 PHARM REV CODE 636 W HCPCS: Performed by: STUDENT IN AN ORGANIZED HEALTH CARE EDUCATION/TRAINING PROGRAM

## 2020-02-11 PROCEDURE — 71000016 HC POSTOP RECOV ADDL HR: Performed by: ORTHOPAEDIC SURGERY

## 2020-02-11 PROCEDURE — D9220A PRA ANESTHESIA: ICD-10-PCS | Mod: ANES,,, | Performed by: ANESTHESIOLOGY

## 2020-02-11 PROCEDURE — 99900035 HC TECH TIME PER 15 MIN (STAT)

## 2020-02-11 PROCEDURE — 71000015 HC POSTOP RECOV 1ST HR: Performed by: ORTHOPAEDIC SURGERY

## 2020-02-11 PROCEDURE — 27380 PR FIX INFRAPATELLA TENDON,PRIMARY: ICD-10-PCS | Mod: 79,51,22,LT | Performed by: ORTHOPAEDIC SURGERY

## 2020-02-11 PROCEDURE — 29877 PR KNEE SCOPE,SHAVE ARTICULAR CART: ICD-10-PCS | Mod: 79,LT,, | Performed by: ORTHOPAEDIC SURGERY

## 2020-02-11 PROCEDURE — 76942 ECHO GUIDE FOR BIOPSY: CPT | Mod: 26,,, | Performed by: ANESTHESIOLOGY

## 2020-02-11 PROCEDURE — 27201423 OPTIME MED/SURG SUP & DEVICES STERILE SUPPLY: Performed by: ORTHOPAEDIC SURGERY

## 2020-02-11 PROCEDURE — C1713 ANCHOR/SCREW BN/BN,TIS/BN: HCPCS | Performed by: ORTHOPAEDIC SURGERY

## 2020-02-11 PROCEDURE — 37000008 HC ANESTHESIA 1ST 15 MINUTES: Performed by: ORTHOPAEDIC SURGERY

## 2020-02-11 PROCEDURE — D9220A PRA ANESTHESIA: Mod: CRNA,,, | Performed by: NURSE ANESTHETIST, CERTIFIED REGISTERED

## 2020-02-11 PROCEDURE — 64448 NJX AA&/STRD FEM NRV NFS IMG: CPT | Mod: 59,LT,, | Performed by: ANESTHESIOLOGY

## 2020-02-11 PROCEDURE — D9220A PRA ANESTHESIA: ICD-10-PCS | Mod: CRNA,,, | Performed by: NURSE ANESTHETIST, CERTIFIED REGISTERED

## 2020-02-11 PROCEDURE — 37000009 HC ANESTHESIA EA ADD 15 MINS: Performed by: ORTHOPAEDIC SURGERY

## 2020-02-11 PROCEDURE — 64448 PR NERVE BLOCK INJ, ANES/STEROID, FEMORAL, CONT INFUSION, INCL IMAG GUIDANCE: ICD-10-PCS | Mod: 59,LT,, | Performed by: ANESTHESIOLOGY

## 2020-02-11 DEVICE — IMPLANTABLE DEVICE: Type: IMPLANTABLE DEVICE | Site: KNEE | Status: FUNCTIONAL

## 2020-02-11 DEVICE — ANCHOR BIOCOMP SWVLLOK: Type: IMPLANTABLE DEVICE | Site: KNEE | Status: FUNCTIONAL

## 2020-02-11 RX ORDER — FENTANYL CITRATE 50 UG/ML
25 INJECTION, SOLUTION INTRAMUSCULAR; INTRAVENOUS EVERY 5 MIN PRN
Status: DISCONTINUED | OUTPATIENT
Start: 2020-02-11 | End: 2020-02-11 | Stop reason: HOSPADM

## 2020-02-11 RX ORDER — OXYCODONE HYDROCHLORIDE 5 MG/1
5 TABLET ORAL
Status: DISCONTINUED | OUTPATIENT
Start: 2020-02-11 | End: 2020-02-11 | Stop reason: HOSPADM

## 2020-02-11 RX ORDER — METHOCARBAMOL 500 MG/1
1000 TABLET, FILM COATED ORAL ONCE
Status: COMPLETED | OUTPATIENT
Start: 2020-02-11 | End: 2020-02-11

## 2020-02-11 RX ORDER — DEXAMETHASONE SODIUM PHOSPHATE 4 MG/ML
INJECTION, SOLUTION INTRA-ARTICULAR; INTRALESIONAL; INTRAMUSCULAR; INTRAVENOUS; SOFT TISSUE
Status: DISCONTINUED | OUTPATIENT
Start: 2020-02-11 | End: 2020-02-11

## 2020-02-11 RX ORDER — SODIUM CHLORIDE 9 MG/ML
INJECTION, SOLUTION INTRAVENOUS CONTINUOUS
Status: DISCONTINUED | OUTPATIENT
Start: 2020-02-11 | End: 2020-02-11 | Stop reason: HOSPADM

## 2020-02-11 RX ORDER — ROCURONIUM BROMIDE 10 MG/ML
INJECTION, SOLUTION INTRAVENOUS
Status: DISCONTINUED | OUTPATIENT
Start: 2020-02-11 | End: 2020-02-11

## 2020-02-11 RX ORDER — ROPIVACAINE/EPI/CLONIDINE/KET 2.46-0.005
SYRINGE (ML) INJECTION
Status: DISCONTINUED | OUTPATIENT
Start: 2020-02-11 | End: 2020-02-11 | Stop reason: HOSPADM

## 2020-02-11 RX ORDER — SODIUM CHLORIDE 9 MG/ML
INJECTION, SOLUTION INTRAVENOUS CONTINUOUS PRN
Status: DISCONTINUED | OUTPATIENT
Start: 2020-02-11 | End: 2020-02-11

## 2020-02-11 RX ORDER — MIDAZOLAM HYDROCHLORIDE 1 MG/ML
0.5 INJECTION INTRAMUSCULAR; INTRAVENOUS
Status: DISCONTINUED | OUTPATIENT
Start: 2020-02-11 | End: 2020-02-11 | Stop reason: HOSPADM

## 2020-02-11 RX ORDER — MUPIROCIN 20 MG/G
OINTMENT TOPICAL
Status: DISCONTINUED | OUTPATIENT
Start: 2020-02-11 | End: 2020-02-11 | Stop reason: HOSPADM

## 2020-02-11 RX ORDER — FENTANYL CITRATE 50 UG/ML
100 INJECTION, SOLUTION INTRAMUSCULAR; INTRAVENOUS EVERY 5 MIN PRN
Status: DISCONTINUED | OUTPATIENT
Start: 2020-02-11 | End: 2020-02-11 | Stop reason: HOSPADM

## 2020-02-11 RX ORDER — CELECOXIB 200 MG/1
200 CAPSULE ORAL ONCE
Status: COMPLETED | OUTPATIENT
Start: 2020-02-11 | End: 2020-02-11

## 2020-02-11 RX ORDER — ONDANSETRON 2 MG/ML
INJECTION INTRAMUSCULAR; INTRAVENOUS
Status: DISCONTINUED | OUTPATIENT
Start: 2020-02-11 | End: 2020-02-11

## 2020-02-11 RX ORDER — PROPOFOL 10 MG/ML
VIAL (ML) INTRAVENOUS
Status: DISCONTINUED | OUTPATIENT
Start: 2020-02-11 | End: 2020-02-11

## 2020-02-11 RX ORDER — VANCOMYCIN HYDROCHLORIDE 1 G/20ML
INJECTION, POWDER, LYOPHILIZED, FOR SOLUTION INTRAVENOUS
Status: DISCONTINUED | OUTPATIENT
Start: 2020-02-11 | End: 2020-02-11 | Stop reason: HOSPADM

## 2020-02-11 RX ORDER — MIDAZOLAM HYDROCHLORIDE 1 MG/ML
INJECTION, SOLUTION INTRAMUSCULAR; INTRAVENOUS
Status: DISCONTINUED | OUTPATIENT
Start: 2020-02-11 | End: 2020-02-11

## 2020-02-11 RX ORDER — GLYCOPYRROLATE 0.2 MG/ML
INJECTION INTRAMUSCULAR; INTRAVENOUS
Status: DISCONTINUED | OUTPATIENT
Start: 2020-02-11 | End: 2020-02-11

## 2020-02-11 RX ORDER — DEXMEDETOMIDINE HYDROCHLORIDE 100 UG/ML
INJECTION, SOLUTION INTRAVENOUS
Status: DISCONTINUED | OUTPATIENT
Start: 2020-02-11 | End: 2020-02-11

## 2020-02-11 RX ORDER — FAMOTIDINE 10 MG/ML
INJECTION INTRAVENOUS
Status: DISCONTINUED | OUTPATIENT
Start: 2020-02-11 | End: 2020-02-11

## 2020-02-11 RX ORDER — KETAMINE HCL IN 0.9 % NACL 50 MG/5 ML
SYRINGE (ML) INTRAVENOUS
Status: DISCONTINUED | OUTPATIENT
Start: 2020-02-11 | End: 2020-02-11

## 2020-02-11 RX ORDER — EPINEPHRINE 1 MG/ML
INJECTION, SOLUTION INTRACARDIAC; INTRAMUSCULAR; INTRAVENOUS; SUBCUTANEOUS
Status: DISCONTINUED | OUTPATIENT
Start: 2020-02-11 | End: 2020-02-11 | Stop reason: HOSPADM

## 2020-02-11 RX ORDER — DEXAMETHASONE SODIUM PHOSPHATE 4 MG/ML
8 INJECTION, SOLUTION INTRA-ARTICULAR; INTRALESIONAL; INTRAMUSCULAR; INTRAVENOUS; SOFT TISSUE ONCE
Status: COMPLETED | OUTPATIENT
Start: 2020-02-11 | End: 2020-02-11

## 2020-02-11 RX ORDER — ACETAMINOPHEN 500 MG
1000 TABLET ORAL
Status: COMPLETED | OUTPATIENT
Start: 2020-02-11 | End: 2020-02-11

## 2020-02-11 RX ORDER — LIDOCAINE HYDROCHLORIDE 10 MG/ML
INJECTION, SOLUTION INTRAVENOUS
Status: DISCONTINUED | OUTPATIENT
Start: 2020-02-11 | End: 2020-02-11

## 2020-02-11 RX ORDER — FENTANYL CITRATE 50 UG/ML
INJECTION, SOLUTION INTRAMUSCULAR; INTRAVENOUS
Status: DISCONTINUED | OUTPATIENT
Start: 2020-02-11 | End: 2020-02-11

## 2020-02-11 RX ORDER — OXYCODONE HYDROCHLORIDE 5 MG/1
10 TABLET ORAL
Status: DISCONTINUED | OUTPATIENT
Start: 2020-02-11 | End: 2020-02-11 | Stop reason: HOSPADM

## 2020-02-11 RX ORDER — NEOSTIGMINE METHYLSULFATE 1 MG/ML
INJECTION, SOLUTION INTRAVENOUS
Status: DISCONTINUED | OUTPATIENT
Start: 2020-02-11 | End: 2020-02-11

## 2020-02-11 RX ADMIN — DEXAMETHASONE SODIUM PHOSPHATE 8 MG: 4 INJECTION, SOLUTION INTRAMUSCULAR; INTRAVENOUS at 01:02

## 2020-02-11 RX ADMIN — OXYCODONE HYDROCHLORIDE 10 MG: 5 TABLET ORAL at 02:02

## 2020-02-11 RX ADMIN — PROPOFOL 250 MG: 10 INJECTION, EMULSION INTRAVENOUS at 10:02

## 2020-02-11 RX ADMIN — ONDANSETRON 4 MG: 2 INJECTION INTRAMUSCULAR; INTRAVENOUS at 10:02

## 2020-02-11 RX ADMIN — GLYCOPYRROLATE 0.4 MG: 0.2 INJECTION, SOLUTION INTRAMUSCULAR; INTRAVENOUS at 12:02

## 2020-02-11 RX ADMIN — GLYCOPYRROLATE 0.2 MG: 0.2 INJECTION, SOLUTION INTRAMUSCULAR; INTRAVENOUS at 10:02

## 2020-02-11 RX ADMIN — CELECOXIB 200 MG: 200 CAPSULE ORAL at 08:02

## 2020-02-11 RX ADMIN — ROCURONIUM BROMIDE 50 MG: 10 INJECTION, SOLUTION INTRAVENOUS at 10:02

## 2020-02-11 RX ADMIN — VANCOMYCIN HYDROCHLORIDE 1.5 G: 1 INJECTION, POWDER, LYOPHILIZED, FOR SOLUTION INTRAVENOUS at 09:02

## 2020-02-11 RX ADMIN — ACETAMINOPHEN 1000 MG: 500 TABLET ORAL at 08:02

## 2020-02-11 RX ADMIN — MIDAZOLAM 2 MG: 1 INJECTION INTRAMUSCULAR; INTRAVENOUS at 09:02

## 2020-02-11 RX ADMIN — METHOCARBAMOL TABLETS 1000 MG: 500 TABLET, COATED ORAL at 01:02

## 2020-02-11 RX ADMIN — DEXMEDETOMIDINE HYDROCHLORIDE 20 MCG: 100 INJECTION, SOLUTION, CONCENTRATE INTRAVENOUS at 10:02

## 2020-02-11 RX ADMIN — SODIUM CHLORIDE: 0.9 INJECTION, SOLUTION INTRAVENOUS at 08:02

## 2020-02-11 RX ADMIN — Medication 30 MG: at 10:02

## 2020-02-11 RX ADMIN — PROPOFOL 50 MG: 10 INJECTION, EMULSION INTRAVENOUS at 12:02

## 2020-02-11 RX ADMIN — SODIUM CHLORIDE, SODIUM GLUCONATE, SODIUM ACETATE, POTASSIUM CHLORIDE, MAGNESIUM CHLORIDE, SODIUM PHOSPHATE, DIBASIC, AND POTASSIUM PHOSPHATE: .53; .5; .37; .037; .03; .012; .00082 INJECTION, SOLUTION INTRAVENOUS at 12:02

## 2020-02-11 RX ADMIN — MIDAZOLAM HYDROCHLORIDE 2 MG: 1 INJECTION, SOLUTION INTRAMUSCULAR; INTRAVENOUS at 08:02

## 2020-02-11 RX ADMIN — FENTANYL CITRATE 25 MCG: 50 INJECTION INTRAMUSCULAR; INTRAVENOUS at 01:02

## 2020-02-11 RX ADMIN — DEXAMETHASONE SODIUM PHOSPHATE 8 MG: 4 INJECTION, SOLUTION INTRAMUSCULAR; INTRAVENOUS at 10:02

## 2020-02-11 RX ADMIN — FAMOTIDINE 20 MG: 10 INJECTION, SOLUTION INTRAVENOUS at 10:02

## 2020-02-11 RX ADMIN — SODIUM CHLORIDE, SODIUM GLUCONATE, SODIUM ACETATE, POTASSIUM CHLORIDE, MAGNESIUM CHLORIDE, SODIUM PHOSPHATE, DIBASIC, AND POTASSIUM PHOSPHATE: .53; .5; .37; .037; .03; .012; .00082 INJECTION, SOLUTION INTRAVENOUS at 10:02

## 2020-02-11 RX ADMIN — LIDOCAINE HYDROCHLORIDE 100 MG: 10 INJECTION, SOLUTION INTRAVENOUS at 10:02

## 2020-02-11 RX ADMIN — FENTANYL CITRATE 100 MCG: 50 INJECTION, SOLUTION INTRAMUSCULAR; INTRAVENOUS at 10:02

## 2020-02-11 RX ADMIN — DEXMEDETOMIDINE HYDROCHLORIDE 10 MCG: 100 INJECTION, SOLUTION, CONCENTRATE INTRAVENOUS at 11:02

## 2020-02-11 RX ADMIN — NEOSTIGMINE METHYLSULFATE 4 MG: 1 INJECTION INTRAVENOUS at 12:02

## 2020-02-11 RX ADMIN — ROPIVACAINE HYDROCHLORIDE: 2 INJECTION, SOLUTION EPIDURAL; INFILTRATION at 01:02

## 2020-02-11 RX ADMIN — SODIUM CHLORIDE: 0.9 INJECTION, SOLUTION INTRAVENOUS at 09:02

## 2020-02-11 NOTE — OP NOTE
DATE OF PROCEDURE: 2/11/2020    ATTENDING SURGEON: Surgeon(s) and Role:     * Grant Membreno MD - Primary    ASSISTANTS:  Walker Huitron MD-Resident  Jacy Whittaker PA-C - Assistant    PREOPERATIVE DIAGNOSIS:  Left  Internal derangement knee M23.90, Tear, Lateral meniscus, acute S83.289A and complex patellar tendon repair    POSTOPERATIVE DIAGNOSIS:   Left  Internal derangement knee M23.90, Synovitis M65.9 and Complex patellar tendon tear    PROCEDURES(S) PERFORMED:   1. Left  patellar tendon repair, complex (reconstruction)  2.  Left  Arthroscopy, knee, synovectomy, major 13848  3.  Left  Arthroscopy, debridement/shaving of articular cartilage (chondroplasty) 62105     Complexity:  Was needed for complex intraoperative decision making based on change in overall of patellofemoral mechanics alignment and tracking.  Results need to make complex intraoperative decision making in terms of tracking of patella alignment the patella particularly on fluoroscopic views using a lateral visualization intraoperatively.  Complexity of the case was increased based on the revision nature of the procedure. There is ch alignment and mechanics as well as tracking.  harjit in overall anatomy secondary to previous operative intervention with scarring in the area concern significant hemarthrosis disruption of the anterior patellar tendon structure and loss of normal patellofemoral      ANESTHESIA: General, Local and Regional w/ catheter adductor with catheter    FLUIDS IN THE CASE:  1500 ml    ESTIMATED BLOOD LOSS: Minimal    URINE OUTPUT: 0 ml    COMPLICATIONS: none    CONDITION ON RETURN TO RECOVERY ROOM: Good     Expand All Collapse All       IMPLANTS UTILIZED: Mitek Helix Anchors triple loaded x 1  Arthrex SwiveLock anchors x2.     GRAFT SOURCE:  Post tibialis anterior allograft, MTF 9.0 mm diameter      INDICATIONS FOR OPERATIVE PROCEDURE: Ba Celestin is a 19 y.o.  male with history of left knee pain and pathology. The  patient's history and physical examination findings consistent with the procedure performed. He noted significant problems in the area of concern with problems on activities of daily living and aggressive use of the left leg. As a result of these problems and problems with overall activity level, the patient was deemed to be an appropriate candidate for operative intervention. Nonoperative versus operative options were discussed. The risks and benefits were discussed with the patient. The patient acknowledged understanding and wished to proceed with operative intervention. Informed consent was obtained prior to the procedure. Details of the surgical procedure were explained, including incisions and probable rehabilitation course. The patient understands the likely length of convalescence after surgery; and we have explained the risks, benefits, and alternatives of surgery. Reasonable expectations and potential complications were discussed and acknowledged, including but not limited to infection, bleeding, blood clots, (DVT and/or PE), nerve injury, retear, instability, continued pain and stiffness. It was also explained that there was a chance of failure which may require further management. The patient agreed and understood and wished to proceed.       FINDINGS:     ARTICULAR CARTILAGE LESION(S):  Medial Femoral Condyle: ICRS Grade 0      Size: none  Medial tibial plateau: ICRS Grade 0      Size: none    Lateral Femoral Condyle: ICRS Grade 0      Size: none  Lateral tibial plateau: ICRS Grade 0      Size: none    Patellar surface: ICRS Grade 2      Size: 1.0 x 1.5 cm  Trochlear groove: ICRS Grade 0      Size: none    EXAMINATION UNDER ANESTHESIA:   Extension 10 degrees  Flexion 20 degrees  Lachman Maneuver:  Negative  Anterior Drawer: Negative  Pivot Shift: Negative  Posterior Drawer:  Negative  Varus stability @ 30 degrees: 0  Valgus stability @ 30 degrees: 0  Patellar glide:3 quadrant lateral, 3 quadrant  medial  (superior patellar migration with patella gerson)          DESCRIPTION OF PROCEDURE: The patient was brought into the Operating Room and placed in supine position. Upon application of Regional w/ catheter  adductor block in the preoperative holding area, the patient underwent General to stabilize the airway. The patient was given the appropriate dose of antibiotics based on body weight. Timeout was utilized to verify the side as the operative side. Both upper extremities were placed in comfortable position. Examination under anesthesia was performed. The nonoperative leg was carefully padded along the heel and peroneal nerve regions and maintained flat on the table. The operative leg was then stabilized with a lateral post for intra-operative positioning as well as a popliteal post placed at the mid-calf level.  A bump was placed under the hip on the operative side. The operative leg was prepped and draped in a sterile fashion with ChloraPrep material.    We injected 0.5% ropivacaine mixture into the anterolateral and anteromedial aspect of the knee with application of 15 mL per portal site. A #11 blade was used to make the arthroscopic portals. Blunt trocar and sheath were inserted into the intercondylar notch and then subsequently into the suprapatellar pouch. This patellofemoral joint was visualized, demonstrating severe lateral patellar tilt, severe patellar subluxation. The patellar tracked laterally with flexion and extension. Arthroscopic pictures were obtained. There was articular cartilage damage was present in the patellofemoral compartment as noted in the Findings section of this operative report. The lesion if present was treated with arthroscopic chondroplasty technique removing all irregular edges and flaps of articular cartilage at the lesion.    Attention was turned to the intercondylar notch where the anterior cruciate ligament (ACL) and posterior cruciate ligament (PCL) structures were  visualized. Visualization demonstrated an intact ACL and an intact PCL. Probe analysis revealed no signs of occult pathology within the ligamentous structures.     Attention was then turned to the lateral compartment. The patient demonstrated an intact lateral meniscus with probe analysis demonstrating no occult tears or pathology Arthroscopic instrumentation was used to veify no tear and pictures obtained. no articular cartilage damage in the lateral compartment The lesion if present was treated withobservation.    Attention was then turned to the medial compartment. The patient demonstrated an intact medial meniscus with probe analysis demonstrating no occult tears or pathology Arthroscopic instrumentation was used to  veify no tear and pictures obtained. There was no articular cartilage damage in the medial compartment The lesion if present was treated with observation.    Arthroscopic major synovectomy was performed in the anterior medial and lateral regions of the knee.    Arthroscopic anterior interval release was performed using the arthroscopic heat probe through both anteromedial and anterolateral portals while visualizing through the alternate portal.     Conversion to open procedure was performed incorporating the previous a medial arthroscopic portal. Ev was placed over the prior to incision while the lateral portals closed with 4 nylon suture. The fascia remained raised superiorly inferiorly medially and laterally. Underlying patellar tendon tear and retinacular tears were noted both medially and laterally across the entire anterior medial lateral aspects of the knee. Extend directly down to the knee joint.  There was a midsubstance type tear with loss of medial patellar tendon tissue particular.  A debridement was performed of the is a concerned the central drill hole was created in the inferior pole of the patella stab to be the placed a Mitek 4.5 mm triple loaded helix anchor.  This was saved  with a Stat.  We then placed an additional 2.  Ortho cord suture in Krackow fashion in the tissue remnant laterally in particular it was placed from distal to proximal.  We placed sutures in the remnant of tissue medially but there was a small amount to utilized during the case.    A posterior tibialis tendon allograft with loupe diameter of 9.0 mm was then sewn in position with the portion maintained proximally. Sewed position with the sutures attached helix anchor at the central patella. The free ends of the distal posterior tibial tendon graft within the sewed with 2.  Ortho cord suture using the speed traps from the Mitek set.  We then made 2 small entry point for the distal soft tissue attachment on tibial tubercle area using a passing suture passed in the free ends of the patella posterior tibialis tendon graft to these areas.  They were not tight at this point. This time fluoroscopic image was utilized and we are able to appropriately reduce the patella into normal anatomic position based on the intraoperative lateral view at 30° flexion assessing the inferior pole patella and how it bisected the Blumensaat's line.    With the patella held in appropriate position and location of the trochlea groove within past the same sutures from the helix anchor proximally through the distal stump of soft tissue in a modified Jo fashion.  Additionally passed the ortho cord sutures medially laterally along the patellar tendon repair site to the depth distal tissue structures.  Again these were placed in modified Jo fashion.  The was taken to reduced position at 30° flexion with appropriate reduction verified fluoroscopically the sutures were then tightened with standard knot-tying technique. Within pulled distal tension on the free ends of the posterior tibialis graft and tapped the areas long medial lateral aspect of the tibial tubercle region following drilling of with the Arthrex set.  I we then placed the  sutures we additionally placed through the appropriate location along the posterior tibial tendon graft in Jo fashion through the eyelet of the Arthrex SwiveLock anchor.  These anchors were then deployed with appropriate tension maintain appropriate reduction of the patella noted on fluoroscopic view.  Excellent reduction was achieved and stability was achieved attention was then turned to additional closure. We left the sutures intact for later parachute technique along the Artelon graft placement.  This point placed a series of #1 Vicryl sutures in figure fashion close soft tissue remnants which were noted medially laterally and anteriorly along the repair site to fully seal the knee joint. We then soaked the our long graft and placed this directly over the top of our repair site for an augmentation technique.  Once again using a parachute technique sutures that we previously placed were then passed through the undersurface of the Artelon graft. The graft was placed in intact position with excellent fixation which was in appropriate tension. This was verified pictures were obtained. Irrigation from the earlier concern within close deep. Deep soft tissue structures with 3-0 Vicryl sutures placed in inverted fashion followed by application of a running 3-0 Monocryl suture placed in subcuticular fashion and an Aquacel bandage.  Patient's leg was then dressed with TENS pads proximally distally , gauze ABD pads cast padding along the Tong hose stocking cooling unit and admit knee immobilizer locked in full extension. Patient lock of the anesthetic LMA removed.  Patient taken recovery in stable condition with rescheduled splint sponge counts were correct.      Note:   was present for the key procedure due for the key portion procedure.    Final arthroscopic pictures were obtained. Fluid was extravasated from the joint. A 4-0 nylon sutures were used to close the arthroscopic portals. We then injected additional  0.5% ropivicaine mixture using 10 ml per portal site and along then anterior portion of the knee. no further treatments were performed to the knee. Xeroform was applied along with application of sterile electrodes proximally and distally, gauze, ABD pads,cast padding, long-leg RUDDY hose stocking and cooling unit. The patient's knee was placed into a hinged immobilizer, which was locked in 10 degrees extension and allowed the flexion to 30 degrees. The patient was then allowed to recover from anesthesia.  General was removed. The patient was taken to Recovery Room in  Good condition. At the completion case, all instrument and sponge counts were correct.    NOTE: I was present and scrubbed for the key portions of the procedure.    PHYSICAL THERAPY:  The patient should begin physical therapy on postoperative   day # 7 and will be advanced to outpatient therapy as soon as   Possible following discharge.  Weight bearing:Toe - Touch left leg  Range of Motion:no motion for 2 weeks    No CPM required due to procedure performed      Additional exercises to be performed are:   avoid motion maintaining the immobilizer locked in hyperextension while not in the CPM    Immediate specific exercises should include:   Gait program should include the above stated weightbearing; in addition: protected gait following symptoms of pain and swelling    Immobilizer if present should be locked @ 0 degrees with gait and allowed to flex to 0 degrees at rest.     Discharge summary:  The patient was discharged to home in Good  Follow-up as scheduled preoperatively.    Medication(s): Refer to Discharge Medication List         Resume preoperative diet as tolerated    Activity per outpatient discharge instruction sheet

## 2020-02-11 NOTE — DISCHARGE INSTRUCTIONS
[address], [place]                                                                                          [phone]                   Postoperative Instructions for Knee Surgery                 Your Surgery Included:   Open               Arthroscopic   [] Ligament Repair       [x] Diagnostic           [] ACL     [] PCL     [] MCL     [] PLLC      [] Synovectomy / Plica Removal [] Meniscal Cartilage Repair / Transplantation      [] Lysis of Adhesions / Manipulation [] Articular Cartilage Repair      [] Interval Release           [] Microfracture       [] OATS   [] ACI      [] Meniscectomy           [] Osteochondral Allograft      [] Meniscal Cartilage Repair  [] Patellar Realignment       [] Debridement / Chondroplasty         [] Lateral Release   [] Ligament Repair      [] Articular Cartilage Repair          [] Extensor Mechanism             []   Microfracture  []  OATS         []  Cartilage Biopsy [x] Tendon Repair          [] Ligament Reconstruction          [x] Patella                  [] Quadriceps             []   ACL    []   PCL  [] High Tibial Osteotomy       [] PRP Arthrocentesis  [] Joint Replacement         [] Amniox Arthrocentesis           [] Unicompartmental   [] Patellofemoral                  Call our office ([phone]) immediately or message through [facility] if you experience any of the following:       Excessive bleeding or pus like drainage at the incision site       Uncontrollable pain not relieved by pain medication       Excessive swelling or redness at the incision site       Fever above 101.5 degrees not controlled with Tylenol or Motrin       Shortness of Breath or severe calf pain       Any foul odor or blistering from the surgery site    FOR EMERGENCIES: [facility] is the best way to contact us. If on the weekend, page the  at [phone] who will direct your call appropriately.    1.   Pain Management: A cold therapy cuff, pain  medications, local injections, TENs unit, and in some cases, regional anesthesia injections are used to manage your post-operative pain. The decision to use each of these options is based on their risks and benefits.     Medications: You were given one or more of the following medication prescriptions during your preoperative appointment. Follow the instructions on the bottles.     Narcotic Medication (usually Percocet, Roxicodone, or Norco): Begin taking the medication before your knee starts to hurt. Some patients do not like to take any medication, but if you wait until your pain is severe before taking, you will be very uncomfortable for several hours waiting for the narcotic to work. Always take with food.     Nausea / Vomiting: For this issue, we prescribe Phenergan or Zofran, use this medication as directed as needed for nausea.     Cold Therapy: You may have been sent home with a Savedaily® cold therapy unit and wrap for your knee. Fill with ice and water to the indicated fill line. You can use 20-30 minutes on then off, several times a day. This will help relieve pain and control swelling. Do not sleep with on.     Regional Anesthesia Injections (Blocks): You may have been given a regional nerve block either before or after surgery. This may make your entire leg numb for 24-36 hours.                            * Proceed with caution when bearing weight on your leg.     2.   Diet: Eat a bland diet for the first day after surgery. Progress your diet as tolerated. Constipation may occur with Narcotic usage. We recommend Colace 100mg twice a day while taking narcotics.    3.   Activity: Limit your activity during the first 48 hours, keep your leg elevated with pillows under your heel. After the first 48 hours at home, increase your activity level based on your symptoms.    4.   Dressing: (b) The soft, bulky dressing will be removed on the 3rd day after surgery. The Aquacel (tan, long adhesive bandage) will  remain on until the 1st post operative appointment. Place waterproof bandages at this time. Keep wounds as dry as possible for first 2 weeks. It is normal for some blood to be seen on the dressings. It is also normal for you to see apparent bruising on the skin around your incisions. If you are concerned by the drainage or the appearance of your wound site, you can send a picture via [facility].    5.   Shower: (b) You may shower on the 3rd day after surgery. The Aquacel bandage is to be covered with saran wrap before showering. Do not get bandage wet. You may see a small incision with a suture. Place waterproof bandages  prior to shower. It is recommended to use Saran wrap before showering. Do not submerge limb in any water for 4 weeks or incisions completely healed.    6.   Knee Brace: You may have been sent home in a hinged knee brace. Your brace is set at 0 to 0 degrees of motion. Wear the brace for 6 weeks, LOCKED in full extension when walking, you will need to wear this brace at all time unless instructed otherwise. You may unlock at rest or for exercises.DO NOT FLEX  (BEND) KNEE FOR 1 WEEK UNTIL POST OP APPT WITH DR. DOCKERY. NO MOTION OF KNEE.     7.   Your procedure did not require a Continuous Passive Motion (CPM) device.    8.   Weight Bearing: You may have been sent home with crutches. If instructed (see below), use these crutches at all times unless at complete rest.      [x] Non-weight bearing- TOE TOUCH WEIGHT BEARING (you may touch your toes to the floor)                9.  Knee Exercises: Begin these exercises the first day after surgery in order to help you regain your motion and strength. You may do the following marked exercises:     [x] Quad Sets - Begin activating your quadriceps muscle by driving your          knee downward into full knee extension while seated on a table or bed   with a towel rolled and propped under your heel     [x] Straight Leg Raise (SLR) - While alonso your quadriceps  "muscle, lift     your fully extended leg to the level of your non-operative knee (as shown)     [] Heel Slides - With the knee straight, slide your heel slowly toward your   buttocks, hold at the endpoint for 10-15 seconds, then slowly straighten     [x] Ankle pumps - With your knee straight, move your ankle in a "pumping"    fashion to activate your calf and leg muscles      10.  Physical Therapy: Physical therapy is an essential component to your recovery from surgery. Your physical therapy will start in 2 weeks.    FIRST POSTOPERATIVE VISIT: As scheduled.         "

## 2020-02-11 NOTE — PROGRESS NOTES
2/11/2020 1538    OnQ teaching done with patients parents. All questions answered. OnQ contract signed and home care instruction pamphlet provided.

## 2020-02-11 NOTE — PLAN OF CARE
AAOX3, in bed supine semi-avitia position. No obvious signs of distress noted. Pt has crutches at the bedside. Will continue to monitor.

## 2020-02-11 NOTE — ANESTHESIA PREPROCEDURE EVALUATION
02/11/2020  Ba Celestin is a 19 y.o., male   Pre-operative evaluation for Procedure(s) (LRB):  REPAIR, TENDON, PATELLAR (Left)    Ba Celestin is a 19 y.o. male       Active problems:  Patient Active Problem List    Diagnosis Date Noted    Patellar tendon rupture, left, initial encounter 02/10/2020    Status post reconstruction of anterior cruciate ligament 01/08/2020    Left knee pain 12/18/2019    Left anterior cruciate ligament tear 12/18/2019    Right ankle pain 11/12/2018    Ankle syndesmosis disruption, right, initial encounter 11/12/2018    Closed displaced Maisonneuve's fracture of right leg 10/30/2018       Prev airway:       Review of patient's allergies indicates:   Allergen Reactions    Penicillins Rash        No current facility-administered medications on file prior to encounter.      Current Outpatient Medications on File Prior to Encounter   Medication Sig Dispense Refill    aspirin (ECOTRIN) 325 MG EC tablet Take 1 tablet at lunch daily starting after surgery for 6 weeks to prevent DVT. 42 tablet 0    celecoxib (CELEBREX) 200 MG capsule Take 1 capsule (200 mg total) by mouth 2 (two) times daily with meals. (breakfast and dinner) 60 capsule 0    loratadine (CLARITIN) 10 mg tablet   0    oxyCODONE-acetaminophen (PERCOCET)  mg per tablet Take 1 tablet by mouth every 4 (four) hours as needed for Pain. 42 tablet 0    promethazine (PHENERGAN) 25 MG tablet Take 1 tablet (25 mg total) by mouth every 4 (four) hours as needed for Nausea. 42 tablet 0       Past Surgical History:   Procedure Laterality Date    APPENDECTOMY      ARTHROSCOPIC CHONDROPLASTY OF KNEE JOINT Left 12/20/2019    Procedure: ARTHROSCOPY, KNEE, WITH CHONDROPLASTY;  Surgeon: Grant Membreno MD;  Location: [facility] OR [address] [place] [address];  Service: Orthopedics;  Laterality: Left;    FIXATION OF SYNDESMOSIS OF ANKLE  Right 10/30/2018    Procedure: FIXATION, SYNDESMOSIS, ANKLE;  Surgeon: Grant Membreno MD;  Location: [facility];  Service: Orthopedics;  Laterality: Right;  GENERAL  REGIONAL W/O CATHETER -ADDUCTOR    KNEE ARTHROSCOPY W/ ACL RECONSTRUCTION Left 12/20/2019    Procedure: RECONSTRUCTION, KNEE, ACL, ARTHROSCOPIC;  Surgeon: Grant Membreno MD;  [facility];  Service: Orthopedics;  Laterality: Left;  Adductor,Clonidine/Epi/Ketorolac/Ropivacaine Injection 30cc,ANIBAL    RECONSTRUCTION OF LIGAMENT Left 12/20/2019    Procedure: RECONSTRUCTION, LIGAMENT,KNEE, EXTRA ARTICULAR;  Surgeon: Grant Membreno MD;  Location: [facility] OR [facility];  Service: Orthopedics;  Laterality: Left;  RECONSTRUCTION, LIGAMENT,Knee,extra-articular    REPAIR OF MENISCUS OF KNEE Left 12/20/2019    Procedure: REPAIR, MENISCUS, KNEE;  Surgeon: Grant Membreno MD;  Location: [facility];  Service: Orthopedics;  Laterality: Left;    SYNOVECTOMY OF KNEE Left 12/20/2019    Procedure: SYNOVECTOMY, KNEE;  Surgeon: Grant Membreno MD;  Location: [facility];  Service: Orthopedics;  Laterality: Left;    TONSILLECTOMY         Social History     Socioeconomic History    Marital status: Single     Spouse name: Not on file    Number of children: Not on file    Years of education: Not on file    Highest education level: Not on file   Occupational History    Not on file   Social Needs    Financial resource strain: Not on file    Food insecurity:     Worry: Not on file     Inability: Not on file    Transportation needs:     Medical: Not on file     Non-medical: Not on file   Tobacco Use    Smoking status: Never Smoker    Smokeless tobacco: Never Used   Substance and Sexual Activity    Alcohol use: No    Drug use: No    Sexual activity: Not on file   Lifestyle    Physical activity:     Days per week: Not on file     Minutes per session: Not on file    Stress: Not on file   Relationships    Social connections:     Talks on phone: Not  on file     Gets together: Not on file     Attends Jewish service: Not on file     Active member of club or organization: Not on file     Attends meetings of clubs or organizations: Not on file     Relationship status: Not on file   Other Topics Concern    Not on file   Social History Narrative    Not on file         Vital Signs Range (Last 24H):  Wt Readings from Last 3 Encounters:   02/11/20 124.7 kg (275 lb) (>99 %, Z= 2.76)*   02/10/20 122.5 kg (270 lb) (>99 %, Z= 2.70)*   01/02/20 129.3 kg (285 lb) (>99 %, Z= 2.88)*     * Growth percentiles are based on [facility] (Boys, 2-20 Years) data.     Temp Readings from Last 3 Encounters:   12/20/19 36.7 °C (98.1 °F) (Temporal)   10/30/18 36.8 °C (98.2 °F) (Oral)     BP Readings from Last 3 Encounters:   02/11/20 (!) 123/57   02/10/20 129/71   01/08/20 127/71     Pulse Readings from Last 3 Encounters:   02/11/20 64   02/10/20 74   01/08/20 (!) 57         CBC:   Lab Results   Component Value Date    WBC 13.1 09/05/2012    HGB 17.0 (H) 09/05/2012    HCT 50.4 (H) 09/05/2012    MCV 85.0 09/05/2012     09/05/2012       CMP: CMP  Sodium   Date Value Ref Range Status   09/05/2012 136 136 - 145 mmol/L Final     Potassium   Date Value Ref Range Status   09/05/2012 3.6 3.5 - 5.1 mmol/L Final     Chloride   Date Value Ref Range Status   09/05/2012 101 95 - 110 mmol/L Final     CO2   Date Value Ref Range Status   09/05/2012 23 23.0 - 29.0 mmol/L Final     Glucose   Date Value Ref Range Status   09/05/2012 111 (H) 70 - 110 mg/dl Final     BUN, Bld   Date Value Ref Range Status   09/05/2012 14 5 - 18 mg/dl Final     Creatinine   Date Value Ref Range Status   09/05/2012 0.7 0.5 - 1.4 mg/dl Final     Calcium   Date Value Ref Range Status   09/05/2012 10.3 8.7 - 10.5 mg/dl Final     Total Protein   Date Value Ref Range Status   09/05/2012 7.9 6.0 - 8.4 g/dL Final     Albumin   Date Value Ref Range Status   09/05/2012 4.6 3.2 - 4.7 g/dl Final     Total Bilirubin   Date Value Ref  Range Status   2012 0.9 0.1 - 1.0 mg/dl Final     Comment:     For infants and newborns, interpretation of results should be based  on gestational age, weight and in agreement with clinical  observations.  .  Premature Infant recommended reference ranges:  Up to 24 hours.............<8.0 mg/dl  Up to 48 hours............<12.0 mg/dl  3-5 days..................<15.0 mg/dl  6-29 days.................<15.0 mg/dl     Alkaline Phosphatase   Date Value Ref Range Status   2012 415 (H) 42 - 362 U/L Final     AST   Date Value Ref Range Status   2012 78 (H) 10 - 40 U/L Final     ALT   Date Value Ref Range Status   2012 139 (H) 10 - 44 U/L Final     Anion Gap   Date Value Ref Range Status   2012 12 8 - 16 mmol/L Final     eGFR if    Date Value Ref Range Status   2012 >60 >60 mL/min Final     Comment:     Estimated glomerular filtration rate (eGFR) is normalized to an  average body surface area of 1.73 square meters.  The calculation  used to obtain the eGFR is the adjusted MDRD equation, which factors  patient sex, age, race, and creatinine result.  Since race is unknown  in our information system, the eGFR values for -American  and Non--American patients are given for each creatinine  result.     eGFR if non    Date Value Ref Range Status   2012 >60 >60 mL/min Final       INR  No results found for: INR, PROTIME        Diagnostic Studies:      EKD Echo:        .    Anesthesia Evaluation    I have reviewed the Patient Summary Reports.    I have reviewed the Nursing Notes.   I have reviewed the Medications.     Review of Systems  Anesthesia Hx:  No problems with previous Anesthesia  History of prior surgery of interest to airway management or planning: Denies Family Hx of Anesthesia complications.   Denies Personal Hx of Anesthesia complications.   Social:  Non-Smoker, No Alcohol Use    Cardiovascular:  Cardiovascular Normal Exercise  tolerance: good     Pulmonary:   Asthma asymptomatic    Renal/:  Renal/ Normal     Hepatic/GI:  Hepatic/GI Normal    Neurological:  Neurology Normal    Endocrine:  Endocrine Normal    Psych:  Psychiatric Normal           Physical Exam  General:  Well nourished    Airway/Jaw/Neck:  Airway Findings: Mouth Opening: Normal Tongue: Normal  General Airway Assessment: Adult  Mallampati: I  Improves to I with phonation.  TM Distance: Normal, at least 6 cm  Jaw/Neck Findings:  Neck ROM: Normal ROM      Dental:  Dental Findings: In tact   Chest/Lungs:  Chest/Lungs Findings: Clear to auscultation     Heart/Vascular:  Heart Findings: Rate: Normal  Rhythm: Regular Rhythm  Sounds: Normal        Mental Status:  Mental Status Findings:  Cooperative         Anesthesia Plan  Type of Anesthesia, risks & benefits discussed:  Anesthesia Type:  general  Patient's Preference:   Intra-op Monitoring Plan:   Intra-op Monitoring Plan Comments:   Post Op Pain Control Plan:   Post Op Pain Control Plan Comments:   Induction:   IV  Beta Blocker:  Patient is not currently on a Beta-Blocker (No further documentation required).       Informed Consent: Patient understands risks and agrees with Anesthesia plan.  Questions answered. Anesthesia consent signed with patient.  ASA Score: 2     Day of Surgery Review of History & Physical:    H&P update referred to the surgeon.         Ready For Surgery From Anesthesia Perspective.

## 2020-02-11 NOTE — TRANSFER OF CARE
"Anesthesia Transfer of Care Note    Patient: Ba Celestin    Procedure(s) Performed: Procedure(s) (LRB):  REPAIR, TENDON, PATELLAR (Left)  ARTHROSCOPY, KNEE, WITH DEBRIDEMENT    Patient location: PACU    Anesthesia Type: general    Transport from OR: Transported from OR on room air with adequate spontaneous ventilation. Transported from OR on 6-10 L/min O2 by face mask with adequate spontaneous ventilation    Post pain: adequate analgesia    Post assessment: no apparent anesthetic complications and tolerated procedure well    Post vital signs: stable    Level of consciousness: sedated    Nausea/Vomiting: no nausea/vomiting    Complications: none    Transfer of care protocol was followed      Last vitals:   Visit Vitals  BP (!) 120/56 (BP Location: Right arm, Patient Position: Lying)   Pulse 69   Resp (!) 23   Ht 5' 10" (1.778 m)   Wt 124.7 kg (275 lb)   SpO2 100%   BMI 39.46 kg/m²     "

## 2020-02-11 NOTE — PROGRESS NOTES
Dr. Carl to beside to bolus adductor catheter. New orders. Will carry out and continue to monitor.

## 2020-02-11 NOTE — BRIEF OP NOTE
Operative Note       Surgery Date: 2/11/2020     Surgeon(s) and Role:     * Grant Membreno MD - Primary    Pre-op Diagnosis:  Patellar tendon rupture, left, initial encounter [S86.812A]  Internal derangement of left knee [M23.92]    Post-op Diagnosis:  Patellar tendon rupture, left, initial encounter [S86.812A]  Internal derangement of left knee [M23.92]      Procedure(s) (LRB):  REPAIR, TENDON, PATELLAR (Left)  ARTHROSCOPY, KNEE, WITH DEBRIDEMENT    Anesthesia: General    Findings/Key Components:  Patellar tendon rupture, left, initial encounter [S86.812A]  Internal derangement of left knee [M23.92]      Core Measure Documentation:  Were antibiotics extended? No  Was the patient administered a VTE Prophylaxis? No. Short procedure; low risk  Estimated Blood Loss: minimal           Specimens (From admission, onward)    None        Implants:   Implant Name Type Inv. Item Serial No.  Lot No. LRB No. Used   ARTELON TISSUE REINFORCEMENT 6CM X9CM    [id number]   Left 1   IMPLANT SYSTEM SECONDARY FIXATION WITH BIOCOMPOSITE SWIVELLOCK     [employer] [id number] Left 1   ANCHOR BIOCOMP SWVLLOK - [id number]  ANCHOR BIOCOMP SWVLLOK  [employer] [id number] Left 1   HEALIX ADVANCE BR 3 SUTURE ANCHOR W/DYNACORD      [id number] Left 1   SPEEDTRAP GRAFT PREPARATIN SYSTEM GREEN/WHITE     [id number] Left 1   SPEEDTRAP GRAFT PREPARATION SYSTEM WHITE     [id number] Left 1       Complications: none           Disposition: PACU - hemodynamically stable.           Condition: Stable    Attestation:  I was present for the entire procedure.

## 2020-02-11 NOTE — ANESTHESIA PROCEDURE NOTES
Left Adductor Cath     Patient location during procedure: pre-op   Block not for primary anesthetic.  Reason for block: at surgeon's request and post-op pain management   Post-op Pain Location: left knee pain   Start time: 2/11/2020 8:50 AM  Timeout: 2/11/2020 8:47 AM   End time: 2/11/2020 8:56 AM    Staffing  Authorizing Provider: Cici Carl MD  Performing Provider: Cici Carl MD    Preanesthetic Checklist  Completed: patient identified, site marked, surgical consent, pre-op evaluation, timeout performed, IV checked, risks and benefits discussed and monitors and equipment checked  Peripheral Block  Patient position: supine  Prep: ChloraPrep and site prepped and draped  Patient monitoring: heart rate, cardiac monitor, continuous pulse ox, continuous capnometry and frequent blood pressure checks  Block type: adductor canal  Laterality: left  Injection technique: continuous  Needle  Needle type: Tuohy   Needle gauge: 17 G  Needle length: 3.5 in  Needle localization: anatomical landmarks and ultrasound guidance  Catheter type: spring wound  Catheter size: 19 G  Test dose: lidocaine 1.5% with Epi 1-to-200,000 and negative   -ultrasound image captured on disc.  Assessment  Injection assessment: negative aspiration, negative parasthesia and local visualized surrounding nerve  Paresthesia pain: none  Heart rate change: no  Slow fractionated injection: yes  Additional Notes  VSS.  [facility] RN monitoring vitals throughout procedure.  Patient tolerated procedure well.

## 2020-02-11 NOTE — H&P
Chief Complaint: Ba Celestin is a 19 y.o. male who had concerns including Pain of the Left Knee.     HPI   Ba Celestin is a 19 y.o. male is here for left knee evaluation.  He left ACL reconstruction, ALL reconstruction, and meniscus repair 12/2019.  He was doing well until 1 week ago when he slipped on ice while back at school in [place].  His right leg slipped and he fell onto a flexed left knee.  He noticed immediate pain, swelling, effusion, and ecchymoses.  He was seen at ED in [place] at the time where xrays were performed.  He was been in his HKB locked in extension and NWB since.     DATE OF PROCEDURE: 12/20/2019     ATTENDING SURGEON: Surgeon(s) and Role:     * Grant Membreno MD - Primary     Assistants:  MD Celestino Villa PA-C        PREOPERATIVE DIAGNOSIS:  Left  Synovitis M65.9, Tear, Lateral meniscus, acute S83.289A and Anterior Cruciate Ligament Tear S83.510     POSTOPERATIVE DIAGNOSIS:   Left  Synovitis M65.9, Tear, Lateral meniscus, acute S83.289A and Anterior Cruciate Ligament Tear S83.510     PROCEDURES(S) PERFORMED:   1. Left  Arthroscopy, anterior cruciate ligament reconstruction 78466   2. Left  Extra-Articular ligament reconstruction (ALL), 80721  3.  Left  Arthroscopy, with meniscus repair (medial OR lateral) 69884, Lateral  4.  Left  Arthroscopy, knee, synovectomy, limited 70300     Review of Systems   Constitution: Negative. Negative for chills, fever, weight gain and weight loss.   HENT: Negative for congestion and sore throat.    Eyes: Negative for blurred vision and double vision.   Cardiovascular: Negative for chest pain, leg swelling and palpitations.   Respiratory: Negative for cough and shortness of breath.    Hematologic/Lymphatic: Does not bruise/bleed easily.   Skin: Positive for color change. Negative for itching, poor wound healing and rash.   Musculoskeletal: Positive for joint pain, joint swelling and stiffness. Negative for back pain, muscle weakness  and myalgias.   Gastrointestinal: Negative for abdominal pain, constipation, diarrhea, nausea and vomiting.   Genitourinary: Negative.  Negative for frequency and hematuria.   Neurological: Negative for dizziness, headaches, numbness, paresthesias and sensory change.   Psychiatric/Behavioral: Negative for altered mental status and depression. The patient is not nervous/anxious.    Allergic/Immunologic: Negative for hives.         Pain Related Questions  Over the past 3 days, what was your average pain during activity? (I.e. running, jogging, walking, climbing stairs, getting dressed, ect.): 1  Over the past 3 days, what was your highest pain level?: 2  Over the past 3 days, what was your lowest pain level? : 0     Other  How many nights a week are you awakened by your affected body part?: 0  Was the patient's HEIGHT measured or patient reported?: Patient Reported  Was the patient's WEIGHT measured or patient reported?: Measured      Objective:      General: Ba is well-developed, well-nourished, appears stated age, in no acute distress, alert and oriented to time, place and person.      General     Vitals reviewed.  Constitutional: He is oriented to person, place, and time. He appears well-developed and well-nourished. No distress.   HENT:   Mouth/Throat: No oropharyngeal exudate.   Eyes: Right eye exhibits no discharge. Left eye exhibits no discharge.   Neck: Normal range of motion.   Cardiovascular: Normal rate and regular rhythm.    Pulmonary/Chest: Effort normal and breath sounds normal. No respiratory distress.   Neurological: He is alert and oriented to person, place, and time. He has normal reflexes. No cranial nerve deficit. Coordination normal.   Psychiatric: He has a normal mood and affect. His behavior is normal. Judgment and thought content normal.      General Musculoskeletal Exam   Gait: antalgic and abnormal         Right Knee Exam      Inspection   Erythema: absent  Scars: absent  Swelling:  absent  Effusion: absent  Deformity: absent  Bruising: absent     Tenderness   The patient is experiencing no tenderness.      Range of Motion   Extension: 0   Flexion: 140      Tests   Meniscus   Lynn:  Medial - negative Lateral - negative  Ligament Examination Lachman: normal (-1 to 2mm) PCL-Posterior Drawer: normal (0 to 2mm)     MCL - Valgus: normal (0 to 2mm)  LCL - Varus: normalPivot Shift: normal (Equal)Reverse Pivot Shift: normal (Equal)Dial Test at 30 degrees: normal (< 5 degrees)Dial Test at 90 degrees: normal (< 5 degrees)  Posterior Sag Test: negative  Posterolateral Corner: unstable (>15 degrees difference)  Patella   Patellar apprehension: negative  Passive Patellar Tilt: neutral  Patellar Tracking: normal  Patellar Glide (quadrants): Lateral - 1   Medial - 2  Q-Angle at 90 degrees: normal  Patellar Grind: negative  J-Sign: none     Other   Meniscal Cyst: absent  Popliteal (Baker's) Cyst: absent  Sensation: normal     Left Knee Exam      Inspection   Erythema: absent  Scars: present  Swelling: present  Effusion: present  Deformity: absent  Bruising: present     Tenderness   The patient tender to palpation of the patellar tendon and patella.     Range of Motion   Extension:  0 (unable to actively extend) normal   Flexion:  40 abnormal      Tests   Meniscus   Lynn:  Medial - negative Lateral - negative  Stability Lachman: abnormal PCL-Posterior Drawer: normal (0 to 2mm)  MCL - Valgus: normal (0 to 2mm)  LCL - Varus: normal (0 to 2mm)Pivot Shift: normal (Equal)Reverse Pivot Shift: normal (Equal)Dial Test at 30 degrees: normal (< 5 degrees)Dial Test at 90 degrees: normal (< 5 degrees)  Posterior Sag Test: negative  Posterolateral Corner: unstable (>15 degrees difference)  Patella   Patellar apprehension: positive  Passive Patellar Tilt: neutral  Patellar Tracking: normal  Patellar Glide (Quadrants): Lateral - 1 Medial - 2  Q-Angle at 90 degrees: normal  Patellar Grind: negative  J-Sign: J sign  absent     Other   Meniscal Cyst: absent  Popliteal (Baker's) Cyst: absent  Sensation: normal     Comments:  Unable to perform SLR.  Cannot actively extend left knee.  Severe guarding     Right Hip Exam      Tests   Enid: negative  Left Hip Exam      Tests   Enid: negative              Muscle Strength   Right Lower Extremity   Hip Abduction: 5/5   Quadriceps:  4/5   Hamstrin/5   Left Lower Extremity   Hip Abduction: 5/5   Quadriceps:  5/5 and 0/5   Hamstrin/5      Reflexes      Left Side  Quadriceps:  2+  Achilles:  2+     Right Side   Quadriceps:  2+  Achilles:  2+     Vascular Exam      Right Pulses  Dorsalis Pedis:      2+  Posterior Tibial:      2+           Left Pulses  Dorsalis Pedis:      2+  Posterior Tibial:      2+              RADIOGRAPHS:  Left knee:  FINDINGS:  Postoperative changes are now identified relating to an interval left anterior cruciate ligament reconstruction procedure.  Osseous structures are otherwise unremarkable.    No evidence of recent fracture or lytic destructive process.  No unusual postoperative findings or significant detrimental interval change since the preoperative examination of 2019 appreciated.     Bilateral knee series:  Left knee patella gerson with swelling in the patellar tendon region; no fracture noted      Assessment:            Encounter Diagnoses   Name Primary?    Left knee pain, unspecified chronicity Yes    Status post reconstruction of anterior cruciate ligament      Acute pain of left knee      Patellar tendon rupture, left, initial encounter            Plan:       1. IKDC, SF-12 and KOOS was not filled out today in clinic.      RTC in 1 weeks with Dr. Grant Membreno for post-operative follow-up.  Patient will not fill out IKDC, SF-12 and KOOS on return.     2. We reviewed with Ba today, the pathology and natural history of his diagnosis. We have discussed a variety of treatment options including medications, physical therapy and other  alternative treatments. I also explained the indications, risks and benefits of surgery. After discussion, Ba decided to proceed with surgery. The decision was made to go forward with :  1. Left knee patellar tendon repair (drill bit set and [employer] Heal[employer] anchors)  2. Left knee arthroscopic debridement  3. Evaluation ACL with possible repair if required  4. Left knee arthroscopic medial and/or lateral meniscal repair  5. Allopatch HD dermal graft vs. Hamstring allograft if required  6. Left knee ALL repair vs augmentation vs reconstruction  7. Possible allograft augmentation of patellar tendon         The details of the surgical procedure were explained, including the location of probable incisions and a description of likely hardware and/or grafts to be used.  The patient understands the likely convalescence after surgery.  Also, we have thoroughly discussed the risks, benefits and alternatives to surgery, including, but not limited to, the risk of infection, joint stiffness, blood clot (including DVT and/or pulmonary embolus), neurologic and vascular injury.  It was explained that, if tissue has been repaired or reconstructed, there is a chance of failure, which may require further management.        All of the patient's questions were answered and informed consent was obtained. The patient will contact us if they have any questions or concerns in the interim.

## 2020-02-11 NOTE — PROGRESS NOTES
"Patient crying, complaining of pain. Notified Dr Carl before administering narcotic. Got "ok" to give PRN fentanyl. Will continue to monitor.   "

## 2020-02-11 NOTE — CARE UPDATE
Nurse went to get parents and have them visit with pt following block, parents are not in waiting area. Called mothers number on file, sent straight to voicemail.

## 2020-02-12 NOTE — PROGRESS NOTES
2/12/2020 2921    Patient/caretaker called at home.  Pain controlled with OnQ.  Patient denies signs of local anesthetic toxicity.  Dressing clean , dry, and intact.  All questions answered. Encouraged to call if any issues arise.

## 2020-02-12 NOTE — ANESTHESIA POSTPROCEDURE EVALUATION
Anesthesia Post Evaluation    Patient: Ba Celestin    Procedure(s) Performed: Procedure(s) (LRB):  REPAIR, TENDON, PATELLAR (Left)  ARTHROSCOPY, KNEE, WITH DEBRIDEMENT    Final Anesthesia Type: general    Patient location during evaluation: PACU  Patient participation: Yes- Able to Participate  Level of consciousness: awake and alert  Post-procedure vital signs: reviewed and stable  Pain management: adequate  Airway patency: patent    PONV status at discharge: No PONV  Anesthetic complications: no      Cardiovascular status: blood pressure returned to baseline  Respiratory status: unassisted  Hydration status: euvolemic  Follow-up not needed.          Vitals Value Taken Time   /60 2/11/2020  3:32 PM   Temp 36.6 °C (97.8 °F) 2/11/2020  3:15 PM   Pulse 86 2/11/2020  3:44 PM   Resp 21 2/11/2020  3:44 PM   SpO2 97 % 2/11/2020  3:44 PM   Vitals shown include unvalidated device data.      Event Time     Out of Recovery 14:15:00          Pain/Clarence Score: Pain Rating Prior to Med Admin: 10 (2/11/2020  2:17 PM)  Pain Rating Post Med Admin: 9 (2/11/2020  3:15 PM)  Clarence Score: 9 (2/11/2020  2:15 PM)

## 2020-02-13 NOTE — PROGRESS NOTES
2/13/2020 1231    Called and spoke with patient's father, reported patient's pain remains well controlled. Patient currently comfortable and resting at home. Denies any s/s of local toxicity. PNC dressing remains dry and intact. On-Q pump to be removed this afternoon once medication is completed. Instructed to check for blue tip to end of catheter upon removal. Denies any other concerns at this time. Encouraged to call with any further questions/concerns.

## 2020-02-18 NOTE — PROGRESS NOTES
Subjective:          Chief Complaint: Ba Celestin is a 19 y.o. male who had concerns including Post-op Evaluation of the Left Knee.      Ba Celestin returns to clinic 8 days s/p Left Patellar tendon repair and arthroscopy. Doing well.  Took Aquacel bandage off yesterday at home.        DATE OF PROCEDURE: 2/11/2020     ATTENDING SURGEON: Surgeon(s) and Role:     * Grant Membreno MD - Primary     ASSISTANTS:  Walker Huitron MD-Resident  Jacy Whittaker PA-C - Assistant     PREOPERATIVE DIAGNOSIS:  Left  Internal derangement knee M23.90, Tear, Lateral meniscus, acute S83.289A and complex patellar tendon repair     POSTOPERATIVE DIAGNOSIS:   Left  Internal derangement knee M23.90, Synovitis M65.9 and Complex patellar tendon tear     PROCEDURES(S) PERFORMED:   1. Left  patellar tendon repair, complex (reconstruction)  2.  Left  Arthroscopy, knee, synovectomy, major 60849  3.  Left  Arthroscopy, debridement/shaving of articular cartilage (chondroplasty) 72433   DATE OF PROCEDURE: 12/20/2019     ATTENDING SURGEON: Surgeon(s) and Role:     * Grant Membreno MD - Primary     Assistants:  MD Celestino Villa PA-C        PREOPERATIVE DIAGNOSIS:  Left  Synovitis M65.9, Tear, Lateral meniscus, acute S83.289A and Anterior Cruciate Ligament Tear S83.510     POSTOPERATIVE DIAGNOSIS:   Left  Synovitis M65.9, Tear, Lateral meniscus, acute S83.289A and Anterior Cruciate Ligament Tear S83.510     PROCEDURES(S) PERFORMED:   1. Left  Arthroscopy, anterior cruciate ligament reconstruction 91474   2. Left  Extra-Articular ligament reconstruction (ALL), 32930  3.  Left  Arthroscopy, with meniscus repair (medial OR lateral) 16175, Lateral  4.  Left  Arthroscopy, knee, synovectomy, limited 29369             Review of Systems   Constitution: Negative. Negative for chills, fever, weight gain and weight loss.   HENT: Negative.  Negative for congestion and sore throat.    Eyes: Negative.  Negative for blurred vision and  double vision.   Cardiovascular: Negative.  Negative for chest pain, leg swelling and palpitations.   Respiratory: Negative.  Negative for cough and shortness of breath.    Endocrine: Negative.    Hematologic/Lymphatic: Negative.  Does not bruise/bleed easily.   Skin: Positive for color change. Negative for itching, poor wound healing and rash.   Musculoskeletal: Positive for joint pain, joint swelling and stiffness. Negative for back pain, muscle weakness and myalgias.   Gastrointestinal: Negative.  Negative for abdominal pain, constipation, diarrhea, nausea and vomiting.   Genitourinary: Negative.  Negative for frequency and hematuria.   Neurological: Negative.  Negative for dizziness, headaches, numbness, paresthesias and sensory change.   Psychiatric/Behavioral: Negative.  Negative for altered mental status and depression. The patient is not nervous/anxious.    Allergic/Immunologic: Negative.  Negative for hives.                   Objective:        General: Ba is well-developed, well-nourished, appears stated age, in no acute distress, alert and oriented to time, place and person.     General    Vitals reviewed.  Constitutional: He is oriented to person, place, and time. He appears well-developed and well-nourished. No distress.   HENT:   Mouth/Throat: No oropharyngeal exudate.   Eyes: Right eye exhibits no discharge. Left eye exhibits no discharge.   Neck: Normal range of motion.   Cardiovascular: Normal rate and regular rhythm.    Pulmonary/Chest: Effort normal and breath sounds normal. No respiratory distress.   Neurological: He is alert and oriented to person, place, and time. He has normal reflexes. No cranial nerve deficit. Coordination normal.   Psychiatric: He has a normal mood and affect. His behavior is normal. Judgment and thought content normal.     General Musculoskeletal Exam   Gait: antalgic and abnormal       Right Knee Exam     Inspection   Erythema: absent  Scars: absent  Swelling:  absent  Effusion: absent  Deformity: absent  Bruising: absent    Tenderness   The patient is experiencing no tenderness.     Range of Motion   Extension: 0   Flexion: 140     Tests   Meniscus   Lynn:  Medial - negative Lateral - negative  Ligament Examination Lachman: normal (-1 to 2mm) PCL-Posterior Drawer: normal (0 to 2mm)     MCL - Valgus: normal (0 to 2mm)  LCL - Varus: normalPivot Shift: normal (Equal)Reverse Pivot Shift: normal (Equal)Dial Test at 30 degrees: normal (< 5 degrees)Dial Test at 90 degrees: normal (< 5 degrees)  Posterior Sag Test: negative  Posterolateral Corner: unstable (>15 degrees difference)  Patella   Patellar apprehension: negative  Passive Patellar Tilt: neutral  Patellar Tracking: normal  Patellar Glide (quadrants): Lateral - 1   Medial - 2  Q-Angle at 90 degrees: normal  Patellar Grind: negative  J-Sign: none    Other   Meniscal Cyst: absent  Popliteal (Baker's) Cyst: absent  Sensation: normal    Left Knee Exam     Inspection   Erythema: absent  Scars: present  Swelling: present  Effusion: present  Deformity: absent  Bruising: present    Tenderness   The patient tender to palpation of the patellar tendon and patella.    Range of Motion   Extension: normal Left knee extension grade: unable to actively extend.   Flexion: abnormal     Tests   Meniscus   Lynn:  Medial - negative Lateral - negative  Stability Lachman: abnormal PCL-Posterior Drawer: normal (0 to 2mm)  MCL - Valgus: normal (0 to 2mm)  LCL - Varus: normal (0 to 2mm)Pivot Shift: normal (Equal)Reverse Pivot Shift: normal (Equal)Dial Test at 30 degrees: normal (< 5 degrees)Dial Test at 90 degrees: normal (< 5 degrees)  Posterior Sag Test: negative  Posterolateral Corner: unstable (>15 degrees difference)  Patella   Patellar apprehension: positive  Passive Patellar Tilt: neutral  Patellar Tracking: normal  Patellar Glide (Quadrants): Lateral - 1 Medial - 2  Q-Angle at 90 degrees: normal  Patellar Grind: negative  J-Sign: J  sign absent    Other   Meniscal Cyst: absent  Popliteal (Baker's) Cyst: absent  Sensation: normal    Comments:  Incisions c/d/i without drainage or signs of infection.  Quad atrophy    Right Hip Exam     Tests   Enid: negative  Left Hip Exam     Tests   Enid: negative          Muscle Strength   Right Lower Extremity   Hip Abduction: 5/5   Quadriceps:  4/5   Hamstrin/5   Left Lower Extremity   Hip Abduction: 5/5   Quadriceps:  3/5   Hamstring:  3/5     Reflexes     Left Side  Quadriceps:  2+  Achilles:  2+    Right Side   Quadriceps:  2+  Achilles:  2+    Vascular Exam     Right Pulses  Dorsalis Pedis:      2+  Posterior Tibial:      2+        Left Pulses  Dorsalis Pedis:      2+  Posterior Tibial:      2+          RADIOGRAPHS:  Left knee:  FINDINGS:  Postoperative changes are now identified relating to an interval left anterior cruciate ligament reconstruction procedure.  Osseous structures are otherwise unremarkable.    No evidence of recent fracture or lytic destructive process.  No unusual postoperative findings or significant detrimental interval change since the preoperative examination of 2019 appreciated.    Bilateral knee series:  Left knee patella gerson with swelling in the patellar tendon region; no fracture noted      Assessment:       Encounter Diagnosis   Name Primary?    Patellar tendon rupture, left, initial encounter Yes          Plan:       1. IKDC, SF-12 and KOOS was filled out today in clinic.     RTC in 1 weeks with Dr. Grant Membreno .  Patient will not fill out IKDC, SF-12 and KOOS on return. Suture removal    2. Provided patient with operative note.    3. May shower now without covering incisions.    4 Continue  mg once daily and Celebrex 200 mg BID for 4 wks.    5. Continue PT per protocol.- referral place today  Tscope brace  (-)10 to 30  Sav Ibarra                      Patient questionnaires may have been collected.

## 2020-02-19 ENCOUNTER — CLINICAL SUPPORT (OUTPATIENT)
Dept: REHABILITATION | Facility: HOSPITAL | Age: 20
End: 2020-02-19
Attending: ORTHOPAEDIC SURGERY
Payer: COMMERCIAL

## 2020-02-19 ENCOUNTER — OFFICE VISIT (OUTPATIENT)
Dept: SPORTS MEDICINE | Facility: CLINIC | Age: 20
End: 2020-02-19
Payer: COMMERCIAL

## 2020-02-19 VITALS
HEART RATE: 80 BPM | DIASTOLIC BLOOD PRESSURE: 67 MMHG | SYSTOLIC BLOOD PRESSURE: 113 MMHG | WEIGHT: 257 LBS | HEIGHT: 70 IN | BODY MASS INDEX: 36.79 KG/M2

## 2020-02-19 DIAGNOSIS — M62.81 MUSCLE WEAKNESS OF LOWER EXTREMITY: ICD-10-CM

## 2020-02-19 DIAGNOSIS — S86.812A PATELLAR TENDON RUPTURE, LEFT, INITIAL ENCOUNTER: Primary | ICD-10-CM

## 2020-02-19 PROCEDURE — 97110 THERAPEUTIC EXERCISES: CPT | Performed by: PHYSICAL THERAPIST

## 2020-02-19 PROCEDURE — 99024 PR POST-OP FOLLOW-UP VISIT: ICD-10-PCS | Mod: S$GLB,,, | Performed by: ORTHOPAEDIC SURGERY

## 2020-02-19 PROCEDURE — 99999 PR PBB SHADOW E&M-EST. PATIENT-LVL III: ICD-10-PCS | Mod: PBBFAC,,, | Performed by: ORTHOPAEDIC SURGERY

## 2020-02-19 PROCEDURE — 97161 PT EVAL LOW COMPLEX 20 MIN: CPT | Performed by: PHYSICAL THERAPIST

## 2020-02-19 PROCEDURE — 99999 PR PBB SHADOW E&M-EST. PATIENT-LVL III: CPT | Mod: PBBFAC,,, | Performed by: ORTHOPAEDIC SURGERY

## 2020-02-19 PROCEDURE — 99024 POSTOP FOLLOW-UP VISIT: CPT | Mod: S$GLB,,, | Performed by: ORTHOPAEDIC SURGERY

## 2020-02-19 NOTE — PLAN OF CARE
[facility]  Physical Therapy Initial Evaluation    Name: Ba Celestin  Clinic Number: [id number]    Therapy Diagnosis:   Encounter Diagnosis   Name Primary?    Muscle weakness of lower extremity      Physician: Grant Membreno MD    Physician Orders: PT Eval and Treat   Medical Diagnosis: S86.812A (ICD-10-CM) - Patellar tendon rupture, left, initial encounter  Evaluation Date: 2/19/2020  Authorization Period Expiration: pend  Plan of Care Certification Period: 11/19/2020  Visit # / Visits authorized: 1 / 1    PROCEDURES(S) PERFORMED:   1. Left  patellar tendon repair, complex (reconstruction)  2.  Left  Arthroscopy, knee, synovectomy, major 39910  3.  Left    DATE OF PROCEDURE: 2/11/2020       Time In: 15:00  Time Out: 16;00  Total Billable Time: 60 minutes    Precautions: Standard  +   PHYSICAL THERAPY:  The patient should begin physical therapy on postoperative   day # 7 and will be advanced to outpatient therapy as soon as   Possible following discharge.  Weight bearing:Toe - Touch left leg  Range of Motion:no motion for 2 weeks     No CPM required due to procedure performed        Additional exercises to be performed are:   avoid motion maintaining the immobilizer locked in hyperextension while not in the CPM     Immediate specific exercises should include:   Gait program should include the above stated weightbearing; in addition: protected gait following symptoms of pain and swelling     Immobilizer if present should be locked @ 0 degrees with gait and allowed to flex to 0 degrees at rest.      Discharge summary:  The patient was discharged to home in [place]  Follow-up as scheduled preoperatively.     Medication(s): Refer to Discharge Medication List         Resume preoperative diet as tolerated     Activity per outpatient discharge instruction sheet      Subjective   Date of onset: 2/3/2020  History of current condition - Ba reports that he slipped on ice while at [email or web address]  "suffering L knee injury leading to above procedure   Pt is familiar to our service having undergone ACL reconstruction/men repair on the left knee as well on 12/20/2019    Past Medical History:   Diagnosis Date    Asthma      Ba Celestin  has a past surgical history that includes Appendectomy; Tonsillectomy; Fixation of syndesmosis of ankle (Right, 10/30/2018); Knee arthroscopy w/ ACL reconstruction (Left, 12/20/2019); Reconstruction of ligament (Left, 12/20/2019); Repair of meniscus of knee (Left, 12/20/2019); Arthroscopic chondroplasty of knee joint (Left, 12/20/2019); Synovectomy of knee (Left, 12/20/2019); Patellar tendon repair (Left, 2/11/2020); and Knee arthroscopy w/ debridement (2/11/2020).    Ba has a current medication list which includes the following prescription(s): aspirin, celecoxib, loratadine, oxycodone-acetaminophen, and promethazine.    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   Prior Level of Function: I  Current Level of Function: restricted ADLs, walking, sports     Pts goals: get back 100% including sports     Objective     Observation:  Enters PT TTWB with B axillary crutches, t-scope brace locked 0 deg, bandage over incision, RUDDY hose, mod effusion, mod quad atrophy     Range of Motion (extension/neutral/flexion):    Right Left   Knee PROM: 5 / 0 / WFLs degrees 0/10/NT secondary to MD precautions "no bending x 2 weeks"      Strength:    Right Left Comment   Knee Extension: 5/5 NT secondary to surgery      Knee Flexion: 5/5 NT secondary to surgery      Hip Abduction: NT at this time  NT at this time       Special Tests: NT secondary to " "surgery      Palpation: (+) TTP peripatellar region       CMS Impairment/Limitation/Restriction for FOTO  Survey    Therapist reviewed FOTO scores for Ba Celestin on 2/19/2020.   FOTO documents entered into [employer] - see Media section.    Limitation Score: 70%  Category: Mobility    Current : CL = least 60% but < 80% impaired, limited or restricted  Goal: CK = at least 40% but < 60% impaired, limited or restricted  Discharge: CJ = at least 20% but < 40% impaired, limited or restricted       TREATMENT   Treatment Time In: 15:30  Treatment Time Out: 16:00  Total Treatment time separate from Evaluation time:15"    Ba received therapeutic exercises to develop strength/endurance  for 15 minutes including:    HSS 5x;15   QS 1x15:05  LLR in brace 2x10:05 0#  Butt winking 1x15:05     CP X 10'     Education     Home Exercises and Patient Education Provided    Education provided re:   - progress towards goals   - role of therapy in multi - disciplinary team, goals for therapy  No spiritual or educational barriers to learning provided    Written Home Exercises Provided: .  Exercises were reviewed and Ba was able to demonstrate them prior to the end of the session.   Pt received a written copy of exercises to perform at home. Ba demonstrated good  understanding of the education provided.     Assessment   Ba is a 19 y.o. male referred to outpatient Physical Therapy with a medical diagnosis of s/p L knee patellar tendon repair. Pt was only 2 months s/p L knee ACL reconstruction / meniscus repair but those structures were not injured in the fall Pt presents with       Pain  Stiffness  Swelling  Decreased ROM  Decreased strength  Gait difficulty   Decreased functional status       Pt prognosis is Good.   Pt will benefit from skilled outpatient Physical Therapy to address the deficits stated above and in the chart below, provide pt/family education, and to maximize pt's level of independence.     Plan of care " discussed with patient: Yes  Pt's spiritual, cultural and educational needs considered and pt agreeable to plan of care and goals as stated below:     Anticipated Barriers for therapy: previous ACL recon/men repair and patellar tendon repair     Medical Necessity is demonstrated by the following  History  Co-morbidities and personal factors that may impact the plan of care Co-morbidities:   none     Personal Factors:   no deficits     low   Examination  Body Structures and Functions, activity limitations and participation restrictions that may impact the plan of care Body Regions:   lower extremities    Body Systems:    ROM  strength  balance  gait  transfers  motor control  edema  scar formation  skin integrity    Participation Restrictions:   ADLs  Sports     Activity limitations:   Mobility  walking    Self care  washing oneself (bathing, drying, washing hands)  caring for body parts (brushing teeth, shaving, grooming)  toileting  dressing    Domestic Life  shopping  cooking  doing house work (cleaning house, washing dishes, laundry)    Community and Social Life  community life  recreation and leisure         moderate   Clinical Presentation stable and uncomplicated low   Decision Making/ Complexity Score: low     Goals     Short-Term Goals: 6 weeks  - The patient will be independent with initial home exercise program.  - The patient is independent with donning/doffing brace to protect tissue healing.  - The patient will be independent amb with crutches on level tile for household distances.  -   Mid Term Goals:  12 weeks   The patient will increase ROM by 15 degrees to perform ambulation and bathing and hygiene with pain < 0/10.  - The patient will increase strength by 1/2 muscle grade to perform ambulation and bathing and hygiene with pain < 0/10.    Long-Term Goals: 36  weeks  - The patient will be independent with home exercise program and symptom management.  - The patient will be independent amb with no  assistive device on all surfaces for community distances.  - The patient will increase ROM = to uninvolved knee to perform ambulation, toileting, dressing and recreation/leisure activities  with pain < 0/10.  - The patient will increase strength = to uninvolved knee  to perform ambulation, toileting, dressing and recreation/leisure activities   with pain < 0/10.      Plan   Certification Period: 2/19/2020 to 11/19/2020.    Outpatient Physical Therapy 1 times weekly for 9 weeks to include the following interventions: patient education, Electrical Stimulation NMES, Gait Training, Manual Therapy, Moist Heat/ Ice, Neuromuscular Re-ed, Patient Education, Therapeutic Activites and Therapeutic Exercise.     Sav Ibarra, PT

## 2020-02-19 NOTE — PROGRESS NOTES
Please see Treatment section for Initial Evaluation and Plan of Care  Sav Ibarra, PT  2/19/2020

## 2020-02-26 ENCOUNTER — TELEPHONE (OUTPATIENT)
Dept: SPORTS MEDICINE | Facility: CLINIC | Age: 20
End: 2020-02-26

## 2020-02-26 ENCOUNTER — OFFICE VISIT (OUTPATIENT)
Dept: SPORTS MEDICINE | Facility: CLINIC | Age: 20
End: 2020-02-26
Payer: COMMERCIAL

## 2020-02-26 ENCOUNTER — HOSPITAL ENCOUNTER (OUTPATIENT)
Dept: RADIOLOGY | Facility: HOSPITAL | Age: 20
Discharge: HOME | End: 2020-02-26
Attending: PHYSICIAN ASSISTANT
Payer: COMMERCIAL

## 2020-02-26 VITALS — HEART RATE: 73 BPM | DIASTOLIC BLOOD PRESSURE: 71 MMHG | SYSTOLIC BLOOD PRESSURE: 126 MMHG

## 2020-02-26 DIAGNOSIS — Z09 SURGERY FOLLOW-UP EXAMINATION: ICD-10-CM

## 2020-02-26 DIAGNOSIS — S86.812S PATELLAR TENDON RUPTURE, LEFT, SEQUELA: Primary | ICD-10-CM

## 2020-02-26 DIAGNOSIS — Z98.890 S/P ACL RECONSTRUCTION: ICD-10-CM

## 2020-02-26 DIAGNOSIS — S86.812S PATELLAR TENDON RUPTURE, LEFT, SEQUELA: ICD-10-CM

## 2020-02-26 PROCEDURE — 99999 PR PBB SHADOW E&M-EST. PATIENT-LVL III: ICD-10-PCS | Mod: PBBFAC,,, | Performed by: PHYSICIAN ASSISTANT

## 2020-02-26 PROCEDURE — 99024 POSTOP FOLLOW-UP VISIT: CPT | Mod: S$GLB,,, | Performed by: PHYSICIAN ASSISTANT

## 2020-02-26 PROCEDURE — 99999 PR PBB SHADOW E&M-EST. PATIENT-LVL III: CPT | Mod: PBBFAC,,, | Performed by: PHYSICIAN ASSISTANT

## 2020-02-26 PROCEDURE — 73560 X-RAY EXAM OF KNEE 1 OR 2: CPT | Mod: TC,LT

## 2020-02-26 PROCEDURE — 73560 XR KNEE 1 OR 2 VIEW LEFT: ICD-10-PCS | Mod: 26,LT,, | Performed by: RADIOLOGY

## 2020-02-26 PROCEDURE — 73560 X-RAY EXAM OF KNEE 1 OR 2: CPT | Mod: 26,LT,, | Performed by: RADIOLOGY

## 2020-02-26 PROCEDURE — 99024 PR POST-OP FOLLOW-UP VISIT: ICD-10-PCS | Mod: S$GLB,,, | Performed by: PHYSICIAN ASSISTANT

## 2020-02-26 RX ORDER — SULFAMETHOXAZOLE AND TRIMETHOPRIM 800; 160 MG/1; MG/1
1 TABLET ORAL 2 TIMES DAILY
Qty: 28 TABLET | Refills: 0 | Status: SHIPPED | OUTPATIENT
Start: 2020-02-26 | End: 2020-03-11

## 2020-02-26 NOTE — PROGRESS NOTES
Subjective:          Chief Complaint: Ba Celestin is a 19 y.o. male who had concerns including Post-op Evaluation of the Left Knee.    Pt presents for 2 week post-op evaluation. Patient reports drainage in the knee started 2 days ago at 3 sites of incision. He reports just wiping it off with a paper towel and today completely saturated paper towel.  He has also been bearing weight, and was unsure about his weight-bearing instructions.  He is doing PT with Sav Ibarra DPT and progressing as scheduled. Pt is taking pain meds as needed. Denies fever, chills, discharge from wound site, and N/V.        DATE OF PROCEDURE: 2/11/2020     ATTENDING SURGEON: Surgeon(s) and Role:     * Grant Membreno MD - Primary     ASSISTANTS:  Walker Huitron MD-Resident  Jacy Whittaker PA-C - Assistant     PREOPERATIVE DIAGNOSIS:  Left  Internal derangement knee M23.90, Tear, Lateral meniscus, acute S83.289A and complex patellar tendon repair     POSTOPERATIVE DIAGNOSIS:   Left  Internal derangement knee M23.90, Synovitis M65.9 and Complex patellar tendon tear     PROCEDURES(S) PERFORMED:   1. Left  patellar tendon repair, complex (reconstruction)  2.  Left  Arthroscopy, knee, synovectomy, major 61162  3.  Left  Arthroscopy, debridement/shaving of articular cartilage (chondroplasty) 40048     DATE OF PROCEDURE: 12/20/2019     ATTENDING SURGEON: Surgeon(s) and Role:     * Grant Membreno MD - Primary     Assistants:  MD Celestino Villa PA-C        PREOPERATIVE DIAGNOSIS:  Left  Synovitis M65.9, Tear, Lateral meniscus, acute S83.289A and Anterior Cruciate Ligament Tear S83.510     POSTOPERATIVE DIAGNOSIS:   Left  Synovitis M65.9, Tear, Lateral meniscus, acute S83.289A and Anterior Cruciate Ligament Tear S83.510     PROCEDURES(S) PERFORMED:   1. Left  Arthroscopy, anterior cruciate ligament reconstruction 33242   2. Left  Extra-Articular ligament reconstruction (ALL), 38479  3.  Left  Arthroscopy, with meniscus repair  (medial OR lateral) 74412, Lateral  4.  Left  Arthroscopy, knee, synovectomy, limited 18969             Review of Systems   Constitution: Negative. Negative for chills, fever, weight gain and weight loss.   HENT: Negative.  Negative for congestion and sore throat.    Eyes: Negative.  Negative for blurred vision and double vision.   Cardiovascular: Negative.  Negative for chest pain, leg swelling and palpitations.   Respiratory: Negative.  Negative for cough and shortness of breath.    Endocrine: Negative.    Hematologic/Lymphatic: Negative.  Does not bruise/bleed easily.   Skin: Positive for color change. Negative for itching, poor wound healing and rash.   Musculoskeletal: Positive for joint pain, joint swelling and stiffness. Negative for back pain, muscle weakness and myalgias.   Gastrointestinal: Negative.  Negative for abdominal pain, constipation, diarrhea, nausea and vomiting.   Genitourinary: Negative.  Negative for frequency and hematuria.   Neurological: Negative.  Negative for dizziness, headaches, numbness, paresthesias and sensory change.   Psychiatric/Behavioral: Negative.  Negative for altered mental status and depression. The patient is not nervous/anxious.    Allergic/Immunologic: Negative.  Negative for hives.                   Objective:        General: Ba is well-developed, well-nourished, appears stated age, in no acute distress, alert and oriented to time, place and person.     General    Vitals reviewed.  Constitutional: He is oriented to person, place, and time. He appears well-developed and well-nourished. No distress.   HENT:   Mouth/Throat: No oropharyngeal exudate.   Eyes: Right eye exhibits no discharge. Left eye exhibits no discharge.   Neck: Normal range of motion.   Cardiovascular: Normal rate and regular rhythm.    Pulmonary/Chest: Effort normal and breath sounds normal. No respiratory distress.   Neurological: He is alert and oriented to person, place, and time. He has normal  reflexes. No cranial nerve deficit. Coordination normal.   Psychiatric: He has a normal mood and affect. His behavior is normal. Judgment and thought content normal.     General Musculoskeletal Exam   Gait: antalgic and abnormal       Right Knee Exam     Inspection   Erythema: absent  Scars: absent  Swelling: absent  Effusion: absent  Deformity: absent  Bruising: absent    Tenderness   The patient is experiencing no tenderness.     Range of Motion   Extension: 0   Flexion: 140     Tests   Meniscus   Lynn:  Medial - negative Lateral - negative  Ligament Examination Lachman: normal (-1 to 2mm) PCL-Posterior Drawer: normal (0 to 2mm)     MCL - Valgus: normal (0 to 2mm)  LCL - Varus: normalPivot Shift: normal (Equal)Reverse Pivot Shift: normal (Equal)Dial Test at 30 degrees: normal (< 5 degrees)Dial Test at 90 degrees: normal (< 5 degrees)  Posterior Sag Test: negative  Posterolateral Corner: unstable (>15 degrees difference)  Patella   Patellar apprehension: negative  Passive Patellar Tilt: neutral  Patellar Tracking: normal  Patellar Glide (quadrants): Lateral - 1   Medial - 2  Q-Angle at 90 degrees: normal  Patellar Grind: negative  J-Sign: none    Other   Meniscal Cyst: absent  Popliteal (Baker's) Cyst: absent  Sensation: normal    Left Knee Exam     Inspection   Erythema: absent  Scars: present  Swelling: present  Effusion: present  Deformity: absent  Bruising: present    Tenderness   The patient tender to palpation of the patellar tendon and patella.    Range of Motion   Extension: normal Left knee extension grade: actively extend.   Flexion: abnormal     Tests   Meniscus   Lynn:  Medial - negative Lateral - negative  Stability Lachman: abnormal PCL-Posterior Drawer: normal (0 to 2mm)  MCL - Valgus: normal (0 to 2mm)  LCL - Varus: normal (0 to 2mm)Pivot Shift: normal (Equal)Reverse Pivot Shift: normal (Equal)Dial Test at 30 degrees: normal (< 5 degrees)Dial Test at 90 degrees: normal (< 5  degrees)  Posterior Sag Test: negative  Posterolateral Corner: unstable (>15 degrees difference)  Patella   Patellar apprehension: positive  Passive Patellar Tilt: neutral  Patellar Tracking: normal  Patellar Glide (Quadrants): Lateral - 1 Medial - 2  Q-Angle at 90 degrees: normal  Patellar Grind: negative  J-Sign: J sign absent    Other   Meniscal Cyst: absent  Popliteal (Baker's) Cyst: absent  Sensation: normal    Comments:  Erythema surrounding incision    Expressible serous drainage from pinpoint proximal incision, middle 3rd incision, distal incision    Pictures provided below    Right Hip Exam     Tests   Enid: negative  Left Hip Exam     Tests   [place]: negative          Muscle Strength   Right Lower Extremity   Hip Abduction: 5/5   Quadriceps:  4/5   Hamstrin/5   Left Lower Extremity   Hip Abduction: 5/5   Quadriceps:  4/5   Hamstrin/5     Reflexes     Left Side  Quadriceps:  2+  Achilles:  2+    Right Side   Quadriceps:  2+  Achilles:  2+    Vascular Exam     Right Pulses  Dorsalis Pedis:      2+  Posterior Tibial:      2+        Left Pulses  Dorsalis Pedis:      2+  Posterior Tibial:      2+          Radiographic Findings 2020:    Postoperative changes are again identified relating to a prior anterior cruciate ligament reconstruction procedure.  The patella now lies in normal relationship with the femur following the repair of the patellar tendon rupture documented on the MR exam of 02/10/2020.  No evidence of superimposed recent fracture, lytic destructive process, or other detrimental change since the preoperative exam of 02/10/2020 noted.  Infrapatellar soft tissue swelling has decreased since that time.    Xrays of the left knee were ordered and reviewed by me today. These findings were discussed and reviewed with the patient.              Assessment:       Encounter Diagnoses   Name Primary?    Patellar tendon rupture, left, sequela Yes    S/P ACL reconstruction     Surgery follow-up  examination           Plan:       1. IKDC, SF-12 and KOOS was filled out today in clinic.     RTC in 1 weeks with Dr. Grant Membreno.  Patient will not fill out IKDC, SF-12 and KOOS on return. Suture removal    2. Wound Vac/Negative Pressure Dressing Change Procedure-53814    Wound site cleansed with iodine.  Utilizing sterile technique, wound vac/negative pressure dressing change was performed today on the  left Knee Joint  without complication.     Wound Documentation:  Wound Condition: Without obvious signs of infection  Tissue Condition: Absence of necrotic, devitalized or non-viable tissue, or other material in the wound that is expected to inhibit healing or promote adjacent tissue breakdown.     Negative pressure/Wound Vac Device: Prevena   Foam: purple foam was cut and placed into the tissue defect  Time Spent: This procedure required 15 minutes.       Plan of care: Wound healing proceeding as expected.   Plan: Will continue wound vac.  Goals: Continue wound vac at this time until granulation tissue sufficient to transition to wet to dry dressings.  Follow up: 1 week(s)      3.  Patient was instructed to keep T scoping mobilizer on locked out in full extension -10 degrees at all times and remain nonweightbearing until follow-up with Dr. Membreno    4.  Continue  mg once daily and Celebrex 200 mg BID for 4 wks.    5.  Prophylactic Bactrim 800-160 mg BID for 14 days.      All of the patient's questions were answered and the patient will contact us if they have any questions or concerns in the interim.                  Patient questionnaires may have been collected.

## 2020-02-27 ENCOUNTER — TELEPHONE (OUTPATIENT)
Dept: SPORTS MEDICINE | Facility: CLINIC | Age: 20
End: 2020-02-27

## 2020-03-02 ENCOUNTER — OFFICE VISIT (OUTPATIENT)
Dept: SPORTS MEDICINE | Facility: CLINIC | Age: 20
End: 2020-03-02
Payer: COMMERCIAL

## 2020-03-02 VITALS
BODY MASS INDEX: 36.79 KG/M2 | DIASTOLIC BLOOD PRESSURE: 73 MMHG | WEIGHT: 257 LBS | SYSTOLIC BLOOD PRESSURE: 121 MMHG | HEIGHT: 70 IN | HEART RATE: 75 BPM

## 2020-03-02 DIAGNOSIS — T81.31XD DEHISCENCE OF OPERATIVE WOUND, SUBSEQUENT ENCOUNTER: ICD-10-CM

## 2020-03-02 DIAGNOSIS — S86.812A PATELLAR TENDON RUPTURE, LEFT, INITIAL ENCOUNTER: Primary | ICD-10-CM

## 2020-03-02 PROBLEM — T81.31XA RUPTURE OF OPERATION WOUND: Status: ACTIVE | Noted: 2020-03-02

## 2020-03-02 PROCEDURE — 99024 POSTOP FOLLOW-UP VISIT: CPT | Mod: S$GLB,,, | Performed by: ORTHOPAEDIC SURGERY

## 2020-03-02 PROCEDURE — 99999 PR PBB SHADOW E&M-EST. PATIENT-LVL III: ICD-10-PCS | Mod: PBBFAC,,, | Performed by: ORTHOPAEDIC SURGERY

## 2020-03-02 PROCEDURE — 99999 PR PBB SHADOW E&M-EST. PATIENT-LVL III: CPT | Mod: PBBFAC,,, | Performed by: ORTHOPAEDIC SURGERY

## 2020-03-02 PROCEDURE — 99024 PR POST-OP FOLLOW-UP VISIT: ICD-10-PCS | Mod: S$GLB,,, | Performed by: ORTHOPAEDIC SURGERY

## 2020-03-02 NOTE — PROGRESS NOTES
Subjective:          Chief Complaint: Ba Celestin is a 19 y.o. male who had concerns including Post-op Evaluation of the Left Knee.    Pt presents for 3 week post op visit s/p below.  He was seen in clinic last week for drainage from his incision and had a Prevena wound VAC placed.  He has been compliant with instructions with this.  No output in vac.        DATE OF PROCEDURE: 2/11/2020     ATTENDING SURGEON: Surgeon(s) and Role:     * Grant Membreno MD - Primary     ASSISTANTS:  Walker Huitron MD-Resident  Jacy Whittaker PA-C - Assistant     PREOPERATIVE DIAGNOSIS:  Left  Internal derangement knee M23.90, Tear, Lateral meniscus, acute S83.289A and complex patellar tendon repair     POSTOPERATIVE DIAGNOSIS:   Left  Internal derangement knee M23.90, Synovitis M65.9 and Complex patellar tendon tear     PROCEDURES(S) PERFORMED:   1. Left  patellar tendon repair, complex (reconstruction)  2.  Left  Arthroscopy, knee, synovectomy, major 54453  3.  Left  Arthroscopy, debridement/shaving of articular cartilage (chondroplasty) 43966     DATE OF PROCEDURE: 12/20/2019     ATTENDING SURGEON: Surgeon(s) and Role:     * Grant Membreno MD - Primary     Assistants:  MD Celestino Villa PA-C        PREOPERATIVE DIAGNOSIS:  Left  Synovitis M65.9, Tear, Lateral meniscus, acute S83.289A and Anterior Cruciate Ligament Tear S83.510     POSTOPERATIVE DIAGNOSIS:   Left  Synovitis M65.9, Tear, Lateral meniscus, acute S83.289A and Anterior Cruciate Ligament Tear S83.510     PROCEDURES(S) PERFORMED:   1. Left  Arthroscopy, anterior cruciate ligament reconstruction 07468   2. Left  Extra-Articular ligament reconstruction (ALL), 65067  3.  Left  Arthroscopy, with meniscus repair (medial OR lateral) 45643, Lateral  4.  Left  Arthroscopy, knee, synovectomy, limited 82948             Review of Systems   Constitution: Negative. Negative for chills, fever, weight gain and weight loss.   HENT: Negative.  Negative for  congestion and sore throat.    Eyes: Negative.  Negative for blurred vision and double vision.   Cardiovascular: Negative.  Negative for chest pain, leg swelling and palpitations.   Respiratory: Negative.  Negative for cough and shortness of breath.    Endocrine: Negative.    Hematologic/Lymphatic: Negative.  Does not bruise/bleed easily.   Skin: Positive for color change. Negative for itching, poor wound healing and rash.   Musculoskeletal: Positive for joint pain, joint swelling and stiffness. Negative for back pain, muscle weakness and myalgias.   Gastrointestinal: Negative.  Negative for abdominal pain, constipation, diarrhea, nausea and vomiting.   Genitourinary: Negative.  Negative for frequency and hematuria.   Neurological: Negative.  Negative for dizziness, headaches, numbness, paresthesias and sensory change.   Psychiatric/Behavioral: Negative.  Negative for altered mental status and depression. The patient is not nervous/anxious.    Allergic/Immunologic: Negative.  Negative for hives.       Pain Related Questions  Over the past 3 days, what was your average pain during activity? (I.e. running, jogging, walking, climbing stairs, getting dressed, ect.): 0  Over the past 3 days, what was your highest pain level?: 0  Over the past 3 days, what was your lowest pain level? : 0    Other  How many nights a week are you awakened by your affected body part?: 0  Was the patient's HEIGHT measured or patient reported?: Patient Reported  Was the patient's WEIGHT measured or patient reported?: Measured      Objective:        General: Ba is well-developed, well-nourished, appears stated age, in no acute distress, alert and oriented to time, place and person.     General    Vitals reviewed.  Constitutional: He is oriented to person, place, and time. He appears well-developed and well-nourished. No distress.   HENT:   Mouth/Throat: No oropharyngeal exudate.   Eyes: Right eye exhibits no discharge. Left eye exhibits no  discharge.   Neck: Normal range of motion.   Cardiovascular: Normal rate and regular rhythm.    Pulmonary/Chest: Effort normal and breath sounds normal. No respiratory distress.   Neurological: He is alert and oriented to person, place, and time. He has normal reflexes. No cranial nerve deficit. Coordination normal.   Psychiatric: He has a normal mood and affect. His behavior is normal. Judgment and thought content normal.     General Musculoskeletal Exam   Gait: antalgic and abnormal       Right Knee Exam     Inspection   Erythema: absent  Scars: absent  Swelling: absent  Effusion: absent  Deformity: absent  Bruising: absent    Tenderness   The patient is experiencing no tenderness.     Range of Motion   Extension: 0   Flexion: 140     Tests   Meniscus   Lynn:  Medial - negative Lateral - negative  Ligament Examination Lachman: normal (-1 to 2mm) PCL-Posterior Drawer: normal (0 to 2mm)     MCL - Valgus: normal (0 to 2mm)  LCL - Varus: normalPivot Shift: normal (Equal)Reverse Pivot Shift: normal (Equal)Dial Test at 30 degrees: normal (< 5 degrees)Dial Test at 90 degrees: normal (< 5 degrees)  Posterior Sag Test: negative  Posterolateral Corner: unstable (>15 degrees difference)  Patella   Patellar apprehension: negative  Passive Patellar Tilt: neutral  Patellar Tracking: normal  Patellar Glide (quadrants): Lateral - 1   Medial - 2  Q-Angle at 90 degrees: normal  Patellar Grind: negative  J-Sign: none    Other   Meniscal Cyst: absent  Popliteal (Baker's) Cyst: absent  Sensation: normal    Left Knee Exam     Inspection   Erythema: absent  Scars: present  Swelling: present  Effusion: present  Deformity: absent  Bruising: absent    Tenderness   The patient tender to palpation of the patellar tendon and patella.    Range of Motion   Extension: normal Left knee extension grade: actively extend.   Flexion: abnormal     Tests   Meniscus   Lynn:  Medial - negative Lateral - negative  Stability Lachman: abnormal  PCL-Posterior Drawer: normal (0 to 2mm)  MCL - Valgus: normal (0 to 2mm)  LCL - Varus: normal (0 to 2mm)Pivot Shift: normal (Equal)Reverse Pivot Shift: normal (Equal)Dial Test at 30 degrees: normal (< 5 degrees)Dial Test at 90 degrees: normal (< 5 degrees)  Posterior Sag Test: negative  Posterolateral Corner: unstable (>15 degrees difference)  Patella   Patellar apprehension: positive  Passive Patellar Tilt: neutral  Patellar Tracking: normal  Patellar Glide (Quadrants): Lateral - 1 Medial - 2  Q-Angle at 90 degrees: normal  Patellar Grind: negative  J-Sign: J sign absent    Other   Meniscal Cyst: absent  Popliteal (Baker's) Cyst: absent  Sensation: normal    Comments:  Small amount of bloody drainage from that incision as well as very distal aspect.    Right Hip Exam     Tests   Enid: negative  Left Hip Exam     Tests   [place]: negative          Muscle Strength   Right Lower Extremity   Hip Abduction: 5/5   Quadriceps:  4/5   Hamstrin/5   Left Lower Extremity   Hip Abduction: 5/5   Quadriceps:  4/5   Hamstrin/5     Reflexes     Left Side  Quadriceps:  2+  Achilles:  2+    Right Side   Quadriceps:  2+  Achilles:  2+    Vascular Exam     Right Pulses  Dorsalis Pedis:      2+  Posterior Tibial:      2+        Left Pulses  Dorsalis Pedis:      2+  Posterior Tibial:      2+          Radiographic Findings 2020:    Postoperative changes are again identified relating to a prior anterior cruciate ligament reconstruction procedure.  The patella now lies in normal relationship with the femur following the repair of the patellar tendon rupture documented on the MR exam of 02/10/2020.  No evidence of superimposed recent fracture, lytic destructive process, or other detrimental change since the preoperative exam of 02/10/2020 noted.  Infrapatellar soft tissue swelling has decreased since that time.    Xrays of the left knee were ordered and reviewed by me today. These findings were discussed and reviewed with the  patient.            Assessment:       Encounter Diagnoses   Name Primary?    Patellar tendon rupture, left, initial encounter Yes    Dehiscence of operative wound, subsequent encounter           Plan:       1. IKDC, SF-12 and KOOS was not filled out today in clinic.     RTC in 1 weeks with Dr. Grant Membreno.  Patient will not fill out IKDC, SF-12 and KOOS on return.     2. Prevena removed, island dressing placed    3.  Patient was instructed to keep T scoping mobilizer on locked out in full extension -10 degrees at all times  - advance to ttwb/25%    4.  Continue  mg once daily and Celebrex 200 mg BID for 4 wks.    5.  Prophylactic Bactrim 800-160 mg BID for 14 days - complete course    6. Resume PT with Sav Ibarra - tt/25% wb      All of the patient's questions were answered and the patient will contact us if they have any questions or concerns in the interim.                  Patient questionnaires may have been collected.

## 2020-03-11 ENCOUNTER — OFFICE VISIT (OUTPATIENT)
Dept: SPORTS MEDICINE | Facility: CLINIC | Age: 20
End: 2020-03-11
Payer: COMMERCIAL

## 2020-03-11 ENCOUNTER — CLINICAL SUPPORT (OUTPATIENT)
Dept: REHABILITATION | Facility: HOSPITAL | Age: 20
End: 2020-03-11
Attending: ORTHOPAEDIC SURGERY
Payer: COMMERCIAL

## 2020-03-11 VITALS
HEART RATE: 65 BPM | BODY MASS INDEX: 36.79 KG/M2 | WEIGHT: 257 LBS | HEIGHT: 70 IN | DIASTOLIC BLOOD PRESSURE: 61 MMHG | SYSTOLIC BLOOD PRESSURE: 103 MMHG

## 2020-03-11 DIAGNOSIS — T81.31XD DEHISCENCE OF OPERATIVE WOUND, SUBSEQUENT ENCOUNTER: ICD-10-CM

## 2020-03-11 DIAGNOSIS — Z98.890 S/P ACL RECONSTRUCTION: ICD-10-CM

## 2020-03-11 DIAGNOSIS — Z09 SURGERY FOLLOW-UP EXAMINATION: ICD-10-CM

## 2020-03-11 DIAGNOSIS — M62.81 MUSCLE WEAKNESS OF LOWER EXTREMITY: ICD-10-CM

## 2020-03-11 DIAGNOSIS — S86.812S PATELLAR TENDON RUPTURE, LEFT, SEQUELA: Primary | ICD-10-CM

## 2020-03-11 DIAGNOSIS — S86.812A PATELLAR TENDON RUPTURE, LEFT, INITIAL ENCOUNTER: ICD-10-CM

## 2020-03-11 PROCEDURE — 97110 THERAPEUTIC EXERCISES: CPT | Performed by: PHYSICAL THERAPIST

## 2020-03-11 PROCEDURE — 99999 PR PBB SHADOW E&M-EST. PATIENT-LVL III: ICD-10-PCS | Mod: PBBFAC,,, | Performed by: ORTHOPAEDIC SURGERY

## 2020-03-11 PROCEDURE — 99024 PR POST-OP FOLLOW-UP VISIT: ICD-10-PCS | Mod: S$GLB,,, | Performed by: ORTHOPAEDIC SURGERY

## 2020-03-11 PROCEDURE — 99999 PR PBB SHADOW E&M-EST. PATIENT-LVL III: CPT | Mod: PBBFAC,,, | Performed by: ORTHOPAEDIC SURGERY

## 2020-03-11 PROCEDURE — 99024 POSTOP FOLLOW-UP VISIT: CPT | Mod: S$GLB,,, | Performed by: ORTHOPAEDIC SURGERY

## 2020-03-11 RX ORDER — SULFAMETHOXAZOLE AND TRIMETHOPRIM 800; 160 MG/1; MG/1
1 TABLET ORAL 2 TIMES DAILY
Qty: 28 TABLET | Refills: 0 | Status: SHIPPED | OUTPATIENT
Start: 2020-03-11 | End: 2021-11-08

## 2020-03-11 NOTE — PROGRESS NOTES
Physical Therapy Daily Treatment Note     Visit Date: 3/11/2020    Name: Ba Celestin  Clinic Number: [id number]    Therapy Diagnosis:   Encounter Diagnoses   Name Primary?    Patellar tendon rupture, left, initial encounter     Muscle weakness of lower extremity      Physician: Grant Membreno MD     Physician Orders: PT Eval and Treat   Medical Diagnosis: S86.812A (ICD-10-CM) - Patellar tendon rupture, left, initial encounter  Evaluation Date: 2/19/2020  Authorization Period Expiration: 12/31/2020  Plan of Care Certification Period: 11/19/2020  Visit # / Visits authorized:   2 / 20     PROCEDURES(S) PERFORMED:   1. Left  patellar tendon repair, complex (reconstruction)  2.  Left  Arthroscopy, knee, synovectomy, major 47735  3.  Left     DATE OF PROCEDURE: 2/11/2020         Time In:  9:30  Time Out: 10;30  Total Billable Time: 45 minutes     Precautions: Standard  +   PHYSICAL THERAPY:  The patient should begin physical therapy on postoperative   day # 7 and will be advanced to outpatient therapy as soon as   Possible following discharge.  Weight bearing:Toe - Touch left leg  Range of Motion:no motion for 2 weeks     No CPM required due to procedure performed        Additional exercises to be performed are:   avoid motion maintaining the immobilizer locked in hyperextension while not in the CPM     Immediate specific exercises should include:   Gait program should include the above stated weightbearing; in addition: protected gait following symptoms of pain and swelling     Immobilizer if present should be locked @ 0 degrees with gait and allowed to flex to 0 degrees at rest.      Discharge summary:  The patient was discharged to home in [place]  Follow-up as scheduled preoperatively.     Medication(s): Refer to Discharge Medication List         Resume preoperative diet as tolerated     Activity per outpatient discharge instruction sheet       Objective     PO 29    Measurements taken:    PROM 0/0/60     Ba  received therapeutic exercises to develop strength, endurance, ROM and flexibility for 45  minutes including:    HSS 5x;15  Grade I-II patellar mobs   QS 1x10:10   Supine SLR in brace 2x10:05 0#  ALR 2x10:06 0# in brace   LLR 2x10:05 0#  In brace   A' DF 3xburn blue   A' PF 2 grey 2x30  PROM k' flex EOT 5x;15     CP x 10'     Home Exercises Provided and Patient Education Provided     Education provided:   - none this visit     Written Home Exercises Provided: yes .  Exercises were reviewed and Ba was able to demonstrate them prior to the end of the session.  Ba demonstrated good  understanding of the education provided.     See EMR under hand out  for exercises provided 3/11/2020.      Assessment     naomi Rx without increase in sxs, PROM improved, pt limited by healing constraints     Ba is progressing well towards his goals.   Pt prognosis is Good.     Pt will continue to benefit from skilled outpatient physical therapy to address the deficits listed in the problem list box on initial evaluation, provide pt/family education and to maximize pt's level of independence in the home and community environment.     Pt's spiritual, cultural and educational needs considered and pt agreeable to plan of care and goals.    Anticipated barriers to physical therapy: none    Goals:     Short-Term Goals: 6 weeks  - The patient will be independent with initial home exercise program.  - The patient is independent with donning/doffing brace to protect tissue healing.  - The patient will be independent amb with crutches on level tile for household distances.  -   Mid Term Goals:  12 weeks   The patient will increase ROM by 15 degrees to perform ambulation and bathing and hygiene with pain < 0/10.  - The patient will increase strength by 1/2 muscle grade to perform ambulation and bathing and hygiene with pain < 0/10.     Long-Term Goals: 36  weeks  - The patient will be independent with home exercise program and symptom  management.  - The patient will be independent amb with no assistive device on all surfaces for community distances.  - The patient will increase ROM = to uninvolved knee to perform ambulation, toileting, dressing and recreation/leisure activities  with pain < 0/10.  - The patient will increase strength = to uninvolved knee  to perform ambulation, toileting, dressing and recreation/leisure activities   with pain < 0/10.         Plan     PROM k' flex to 90 deg by 6 weeks     Continue with established Plan of Care towards PT goals with focus on decreasing pain, increasing ROM, strength, neuromuscular control and functional status       Sav Ibarra, PT

## 2020-03-11 NOTE — PROGRESS NOTES
Subjective:          Chief Complaint: Ba Celestin is a 19 y.o. male who had concerns including Follow-up of the Left Knee.    Pt presents for wound check and 4 week post op visit s/p below. 0/10 pain today. He was seen in clinic 2 week for drainage from his incision and had a Prevena wound VAC placed. No drainage since last visit. Finished the prophylactic Bactrim for 14 days.         DATE OF PROCEDURE: 2/11/2020     ATTENDING SURGEON: Surgeon(s) and Role:     * Grant Membreno MD - Primary     ASSISTANTS:  Walker Huitron MD-Resident  Jacy Whittaker PA-C - Assistant     PREOPERATIVE DIAGNOSIS:  Left  Internal derangement knee M23.90, Tear, Lateral meniscus, acute S83.289A and complex patellar tendon repair     POSTOPERATIVE DIAGNOSIS:   Left  Internal derangement knee M23.90, Synovitis M65.9 and Complex patellar tendon tear     PROCEDURES(S) PERFORMED:   1. Left  patellar tendon repair, complex (reconstruction)  2.  Left  Arthroscopy, knee, synovectomy, major 73348  3.  Left  Arthroscopy, debridement/shaving of articular cartilage (chondroplasty) 60197     DATE OF PROCEDURE: 12/20/2019     ATTENDING SURGEON: Surgeon(s) and Role:     * Grant Membreno MD - Primary     Assistants:  MD Celestino Villa PA-C        PREOPERATIVE DIAGNOSIS:  Left  Synovitis M65.9, Tear, Lateral meniscus, acute S83.289A and Anterior Cruciate Ligament Tear S83.510     POSTOPERATIVE DIAGNOSIS:   Left  Synovitis M65.9, Tear, Lateral meniscus, acute S83.289A and Anterior Cruciate Ligament Tear S83.510     PROCEDURES(S) PERFORMED:   1. Left  Arthroscopy, anterior cruciate ligament reconstruction 69188   2. Left  Extra-Articular ligament reconstruction (ALL), 32656  3.  Left  Arthroscopy, with meniscus repair (medial OR lateral) 73715, Lateral  4.  Left  Arthroscopy, knee, synovectomy, limited 45545             Review of Systems   Constitution: Negative. Negative for chills, fever, weight gain and weight loss.   HENT:  Negative.  Negative for congestion and sore throat.    Eyes: Negative.  Negative for blurred vision and double vision.   Cardiovascular: Negative.  Negative for chest pain, leg swelling and palpitations.   Respiratory: Negative.  Negative for cough and shortness of breath.    Endocrine: Negative.    Hematologic/Lymphatic: Negative.  Does not bruise/bleed easily.   Skin: Positive for color change. Negative for itching, poor wound healing and rash.   Musculoskeletal: Positive for joint pain, joint swelling and stiffness. Negative for back pain, muscle weakness and myalgias.   Gastrointestinal: Negative.  Negative for abdominal pain, constipation, diarrhea, nausea and vomiting.   Genitourinary: Negative.  Negative for frequency and hematuria.   Neurological: Negative.  Negative for dizziness, headaches, numbness, paresthesias and sensory change.   Psychiatric/Behavioral: Negative.  Negative for altered mental status and depression. The patient is not nervous/anxious.    Allergic/Immunologic: Negative.  Negative for hives.                   Objective:        General: Ba is well-developed, well-nourished, appears stated age, in no acute distress, alert and oriented to time, place and person.     General    Vitals reviewed.  Constitutional: He is oriented to person, place, and time. He appears well-developed and well-nourished. No distress.   HENT:   Mouth/Throat: No oropharyngeal exudate.   Eyes: Right eye exhibits no discharge. Left eye exhibits no discharge.   Neck: Normal range of motion.   Cardiovascular: Normal rate and regular rhythm.    Pulmonary/Chest: Effort normal and breath sounds normal. No respiratory distress.   Neurological: He is alert and oriented to person, place, and time. He has normal reflexes. No cranial nerve deficit. Coordination normal.   Psychiatric: He has a normal mood and affect. His behavior is normal. Judgment and thought content normal.     General Musculoskeletal Exam   Gait:  antalgic and abnormal       Right Knee Exam     Inspection   Erythema: absent  Scars: absent  Swelling: absent  Effusion: absent  Deformity: absent  Bruising: absent    Tenderness   The patient is experiencing no tenderness.     Range of Motion   Extension: 0   Flexion: 140     Tests   Meniscus   Lynn:  Medial - negative Lateral - negative  Ligament Examination Lachman: normal (-1 to 2mm) PCL-Posterior Drawer: normal (0 to 2mm)     MCL - Valgus: normal (0 to 2mm)  LCL - Varus: normalPivot Shift: normal (Equal)Reverse Pivot Shift: normal (Equal)Dial Test at 30 degrees: normal (< 5 degrees)Dial Test at 90 degrees: normal (< 5 degrees)  Posterior Sag Test: negative  Posterolateral Corner: unstable (>15 degrees difference)  Patella   Patellar apprehension: negative  Passive Patellar Tilt: neutral  Patellar Tracking: normal  Patellar Glide (quadrants): Lateral - 1   Medial - 2  Q-Angle at 90 degrees: normal  Patellar Grind: negative  J-Sign: none    Other   Meniscal Cyst: absent  Popliteal (Baker's) Cyst: absent  Sensation: normal    Left Knee Exam     Inspection   Erythema: absent  Scars: present  Swelling: present  Effusion: absent  Deformity: absent  Bruising: absent    Tenderness   The patient is experiencing no tenderness.     Range of Motion   Extension: normal Left knee extension grade: actively extend.   Flexion:  60 abnormal     Tests   Meniscus   Lynn:  Medial - negative Lateral - negative  Stability Lachman: abnormal PCL-Posterior Drawer: normal (0 to 2mm)  MCL - Valgus: normal (0 to 2mm)  LCL - Varus: normal (0 to 2mm)Pivot Shift: normal (Equal)Reverse Pivot Shift: normal (Equal)Dial Test at 30 degrees: normal (< 5 degrees)Dial Test at 90 degrees: normal (< 5 degrees)  Posterior Sag Test: negative  Posterolateral Corner: unstable (>15 degrees difference)  Patella   Patellar apprehension: positive  Passive Patellar Tilt: neutral  Patellar Tracking: normal  Patellar Glide (Quadrants): Lateral - 1 Medial  - 2  Q-Angle at 90 degrees: normal  Patellar Grind: negative  J-Sign: J sign absent    Other   Meniscal Cyst: absent  Popliteal (Baker's) Cyst: absent  Sensation: normal    Comments:  Small amount of bloody drainage from very distal incision.    Right Hip Exam     Tests   Enid: negative  Left Hip Exam     Tests   Enid: negative          Muscle Strength   Right Lower Extremity   Hip Abduction: 5/5   Quadriceps:  4/5   Hamstrin/5   Left Lower Extremity   Hip Abduction: 5/5   Quadriceps:  4/5   Hamstrin/5     Reflexes     Left Side  Quadriceps:  2+  Achilles:  2+    Right Side   Quadriceps:  2+  Achilles:  2+    Vascular Exam     Right Pulses  Dorsalis Pedis:      2+  Posterior Tibial:      2+        Left Pulses  Dorsalis Pedis:      2+  Posterior Tibial:      2+          Radiographic Findings:    Postoperative changes are again identified relating to a prior anterior cruciate ligament reconstruction procedure.  The patella now lies in normal relationship with the femur following the repair of the patellar tendon rupture documented on the MR exam of 02/10/2020.  No evidence of superimposed recent fracture, lytic destructive process, or other detrimental change since the preoperative exam of 02/10/2020 noted.  Infrapatellar soft tissue swelling has decreased since that time.    Xrays of the left knee were reviewed by me today. These findings were discussed and reviewed with the patient.            Assessment:       Encounter Diagnoses   Name Primary?    Patellar tendon rupture, left, sequela Yes    Dehiscence of operative wound, subsequent encounter     S/P ACL reconstruction     Surgery follow-up examination           Plan:       1. IKDC, SF-12 and KOOS was not filled out today in clinic.     RTC in 2 weeks with Dr. Grant Membreno for 6 week postop visit.  Patient will not fill out IKDC, SF-12 and KOOS on return.     2.  Patient was instructed to keep T scoping mobilizer on locked out in full extension -10  degrees at all times  - advance to 50%    3.  Continue  mg once daily and Celebrex 200 mg BID for 2 wks.    4. Resume PT with Sav Ibarra - 50 %wb to full WBAT    5. Continue Bactrim DS prescribed todayy      All of the patient's questions were answered and the patient will contact us if they have any questions or concerns in the interim.                  Patient questionnaires may have been collected.

## 2020-03-20 ENCOUNTER — TELEPHONE (OUTPATIENT)
Dept: SPORTS MEDICINE | Facility: CLINIC | Age: 20
End: 2020-03-20

## 2020-03-20 NOTE — TELEPHONE ENCOUNTER
Spoke with patient to reschedule his appointment to 3/23 at 1:30 pm per Odessa Agudelo's request.

## 2020-03-20 NOTE — TELEPHONE ENCOUNTER
LVM for patient in regard to appointment on 3/25. Would like to have the patient come in on 3/23.

## 2020-03-23 ENCOUNTER — TELEPHONE (OUTPATIENT)
Dept: SPORTS MEDICINE | Facility: CLINIC | Age: 20
End: 2020-03-23

## 2020-03-23 NOTE — TELEPHONE ENCOUNTER
Attempted to call patient to confirm that his is coming to his appt and to have him come in early if he is.

## 2020-03-23 NOTE — TELEPHONE ENCOUNTER
Spoke to the patient regarding his appt. 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 He will call back to discuss his appt rescheduling.

## 2020-04-01 ENCOUNTER — CLINICAL SUPPORT (OUTPATIENT)
Dept: REHABILITATION | Facility: HOSPITAL | Age: 20
End: 2020-04-01
Attending: ORTHOPAEDIC SURGERY
Payer: COMMERCIAL

## 2020-04-01 DIAGNOSIS — M62.81 MUSCLE WEAKNESS OF LOWER EXTREMITY: Primary | ICD-10-CM

## 2020-04-01 PROCEDURE — 97110 THERAPEUTIC EXERCISES: CPT | Performed by: PHYSICAL THERAPIST

## 2020-04-01 NOTE — PROGRESS NOTES
"  Physical Therapy Daily Treatment Note     Visit Date: 4/1/2020    Name: Ba Celestin  Clinic Number: [id number]    Therapy Diagnosis:   Encounter Diagnosis   Name Primary?    Muscle weakness of lower extremity Yes     Physician: Grant Membreno MD     Physician Orders: PT Eval and Treat   Medical Diagnosis: S86.812A (ICD-10-CM) - Patellar tendon rupture, left, initial encounter  Evaluation Date: 2/19/2020  Authorization Period Expiration: 12/31/2020  Plan of Care Certification Period: 11/19/2020  Visit # / Visits authorized:   3 / 20     PROCEDURES(S) PERFORMED:   1. Left  patellar tendon repair, complex (reconstruction)  2.  Left  Arthroscopy, knee, synovectomy, major 55667  3.  Left     DATE OF PROCEDURE: 2/11/2020         Time In:  11:30  Time Out: 12;40  Total Billable Time: 60 minutes     Precautions: Standard  +   PHYSICAL THERAPY:  The patient should begin physical therapy on postoperative   day # 7 and will be advanced to outpatient therapy as soon as   Possible following discharge.  Weight bearing:Toe - Touch left leg  Range of Motion:no motion for 2 weeks     No CPM required due to procedure performed        Additional exercises to be performed are:   avoid motion maintaining the immobilizer locked in hyperextension while not in the CPM     Immediate specific exercises should include:   Gait program should include the above stated weightbearing; in addition: protected gait following symptoms of pain and swelling     Immobilizer if present should be locked @ 0 degrees with gait and allowed to flex to 0 degrees at rest.      Discharge summary:  The patient was discharged to home in [place]  Follow-up as scheduled preoperatively.     Medication(s): Refer to Discharge Medication List         Resume preoperative diet as tolerated     Activity per outpatient discharge instruction sheet        Subjective      Pt reports no c/o L knee pain this AM "I had a Dr appt today but they canceled it"      he was compliant " with home exercise program given last session.   Response to previous treatment:good  Functional change: pt limited by healing constraints     Pain: 0/10 at rest this AM  Location: left knee          Objective     PO 50    Measurements taken:    PROM 0/0/90  (improved)     Ba received therapeutic exercises to develop strength, endurance, ROM and flexibility for 60  minutes including:    Self ROM k' flex EOT 5x:15  Supine heel slides 2x15  Prone AROM k' flex 2x15  Self ROM k' flex prone 5x;15  QS 1x10:10  Supine SLR 2x10:05 0# no brace  ALR 2x10:05 0# no brace  LLR 2x10:05 0# no brace     Pt declined use of CP     Home Exercises Provided and Patient Education Provided     Education provided:   - none this visit     Written Home Exercises Provided: yes .  Exercises were reviewed and Ba was able to demonstrate them prior to the end of the session.  Ba demonstrated good  understanding of the education provided.     See EMR under hand out  for exercises provided 3/11/2020.      Assessment     naomi Rx without increase in sxs, PROM improved, pt limited by healing constraints     Ba is progressing well towards his goals.   Pt prognosis is Good.     Pt will continue to benefit from skilled outpatient physical therapy to address the deficits listed in the problem list box on initial evaluation, provide pt/family education and to maximize pt's level of independence in the home and community environment.     Pt's spiritual, cultural and educational needs considered and pt agreeable to plan of care and goals.    Anticipated barriers to physical therapy: none    Goals:     Short-Term Goals: 6 weeks  - The patient will be independent with initial home exercise program.  - The patient is independent with donning/doffing brace to protect tissue healing.  - The patient will be independent amb with crutches on level tile for household distances.  -   Mid Term Goals:  12 weeks   The patient will increase ROM by 15  degrees to perform ambulation and bathing and hygiene with pain < 0/10.  - The patient will increase strength by 1/2 muscle grade to perform ambulation and bathing and hygiene with pain < 0/10.     Long-Term Goals: 36  weeks  - The patient will be independent with home exercise program and symptom management.  - The patient will be independent amb with no assistive device on all surfaces for community distances.  - The patient will increase ROM = to uninvolved knee to perform ambulation, toileting, dressing and recreation/leisure activities  with pain < 0/10.  - The patient will increase strength = to uninvolved knee  to perform ambulation, toileting, dressing and recreation/leisure activities   with pain < 0/10.         Plan     PROM k' flex to 90 deg by 6 weeks     Continue with established Plan of Care towards PT goals with focus on decreasing pain, increasing ROM, strength, neuromuscular control and functional status       Sav Ibarra, PT

## 2020-04-06 ENCOUNTER — CLINICAL SUPPORT (OUTPATIENT)
Dept: REHABILITATION | Facility: HOSPITAL | Age: 20
End: 2020-04-06
Attending: ORTHOPAEDIC SURGERY
Payer: COMMERCIAL

## 2020-04-06 DIAGNOSIS — M62.81 MUSCLE WEAKNESS OF LOWER EXTREMITY: Primary | ICD-10-CM

## 2020-04-06 PROCEDURE — 97110 THERAPEUTIC EXERCISES: CPT | Performed by: PHYSICAL THERAPIST

## 2020-04-06 NOTE — PROGRESS NOTES
"  Physical Therapy Daily Treatment Note     Visit Date: 4/6/2020    Name: Ba Celestin  Clinic Number: [id number]    Therapy Diagnosis:   Encounter Diagnosis   Name Primary?    Muscle weakness of lower extremity Yes     Physician: Grant Membreno MD     Physician Orders: PT Eval and Treat   Medical Diagnosis: S86.812A (ICD-10-CM) - Patellar tendon rupture, left, initial encounter  Evaluation Date: 2/19/2020  Authorization Period Expiration: 12/31/2020  Plan of Care Certification Period: 11/19/2020  Visit # / Visits authorized:   3 / 20     PROCEDURES(S) PERFORMED:   1. Left  patellar tendon repair, complex (reconstruction)  2.  Left  Arthroscopy, knee, synovectomy, major 80909  3.  Left     DATE OF PROCEDURE: 2/11/2020         Time In:  15:30  Time Out: 16;40  Total Billable Time: 60 minutes     Precautions: Standard  +   PHYSICAL THERAPY:  The patient should begin physical therapy on postoperative   day # 7 and will be advanced to outpatient therapy as soon as   Possible following discharge.  Weight bearing:Toe - Touch left leg  Range of Motion:no motion for 2 weeks     No CPM required due to procedure performed        Additional exercises to be performed are:   avoid motion maintaining the immobilizer locked in hyperextension while not in the CPM     Immediate specific exercises should include:   Gait program should include the above stated weightbearing; in addition: protected gait following symptoms of pain and swelling     Immobilizer if present should be locked @ 0 degrees with gait and allowed to flex to 0 degrees at rest.      Discharge summary:  The patient was discharged to home in [place]  Follow-up as scheduled preoperatively.     Medication(s): Refer to Discharge Medication List         Resume preoperative diet as tolerated     Activity per outpatient discharge instruction sheet        Subjective      Pt reports "It feels like one big blob"  Pt referring to his L knee     he was compliant with home " exercise program given last session.   Response to previous treatment:good  Functional change: pt limited by healing constraints     Pain: 0/10 at rest this AM  Location: left knee          Objective     PO 55    Measurements taken:   PROM k' flex 104 deg prone   (PROM 0/0/90)     Ba received therapeutic exercises to develop strength, endurance, ROM and flexibility for 60  minutes including:    Stick upper/lower leg   Self ROM k' flex EOT 5x:15  Supine heel slides 2x15  Prone AROM k' flex 2x15  Self ROM k' flex prone + RF and prone 5x;15  Bike x 10' 4.0 seat at 7  QS 1x10:10  Supine SLR 2x10:05 0# no brace  LLR 1x20:05  2.5# no brace   Seated k' quad karen in chair against table 1x30:06    Pt declined use of CP     Home Exercises Provided and Patient Education Provided     Education provided:   - none this visit     Written Home Exercises Provided: yes .  Exercises were reviewed and Ba was able to demonstrate them prior to the end of the session.  Ba demonstrated good  understanding of the education provided.     See EMR under hand out  for exercises provided 4/6/2020 (add seated chair k' ext karen holds)      Assessment     naomi Rx without increase in sxs, pt with improved PROM k' flex and improved quad activation     Ba is progressing well towards his goals.   Pt prognosis is Good.     Pt will continue to benefit from skilled outpatient physical therapy to address the deficits listed in the problem list box on initial evaluation, provide pt/family education and to maximize pt's level of independence in the home and community environment.     Pt's spiritual, cultural and educational needs considered and pt agreeable to plan of care and goals.    Anticipated barriers to physical therapy: none    Goals:     Short-Term Goals: 6 weeks  - The patient will be independent with initial home exercise program.  - The patient is independent with donning/doffing brace to protect tissue healing.  - The patient  will be independent amb with crutches on level tile for household distances.  -   Mid Term Goals:  12 weeks   The patient will increase ROM by 15 degrees to perform ambulation and bathing and hygiene with pain < 0/10.  - The patient will increase strength by 1/2 muscle grade to perform ambulation and bathing and hygiene with pain < 0/10.     Long-Term Goals: 36  weeks  - The patient will be independent with home exercise program and symptom management.  - The patient will be independent amb with no assistive device on all surfaces for community distances.  - The patient will increase ROM = to uninvolved knee to perform ambulation, toileting, dressing and recreation/leisure activities  with pain < 0/10.  - The patient will increase strength = to uninvolved knee  to perform ambulation, toileting, dressing and recreation/leisure activities   with pain < 0/10.         Plan     PROM k' flex to 90 deg by 6 weeks     Continue with established Plan of Care towards PT goals with focus on decreasing pain, increasing ROM, strength, neuromuscular control and functional status       Sav Ibarra, PT

## 2020-04-13 ENCOUNTER — CLINICAL SUPPORT (OUTPATIENT)
Dept: REHABILITATION | Facility: HOSPITAL | Age: 20
End: 2020-04-13
Payer: COMMERCIAL

## 2020-04-13 DIAGNOSIS — M62.81 MUSCLE WEAKNESS OF LOWER EXTREMITY: Primary | ICD-10-CM

## 2020-04-13 PROCEDURE — 97110 THERAPEUTIC EXERCISES: CPT | Performed by: PHYSICAL THERAPIST

## 2020-04-13 NOTE — PROGRESS NOTES
Physical Therapy Daily Treatment Note     Visit Date: 4/13/2020    Name: Ba Celestin  Clinic Number: [id number]    Therapy Diagnosis:   Encounter Diagnosis   Name Primary?    Muscle weakness of lower extremity Yes     Physician: Grant Membreno MD     Physician Orders: PT Eval and Treat   Medical Diagnosis: S86.812A (ICD-10-CM) - Patellar tendon rupture, left, initial encounter  Evaluation Date: 2/19/2020  Authorization Period Expiration: 12/31/2020  Plan of Care Certification Period: 11/19/2020  Visit # / Visits authorized:   4 / 20     PROCEDURES(S) PERFORMED:   1. Left  patellar tendon repair, complex (reconstruction)  2.  Left  Arthroscopy, knee, synovectomy, major 06685  3.  Left     DATE OF PROCEDURE: 2/11/2020         Time In:  15:30  Time Out: 16;40  Total Billable Time: 60 minutes     Precautions: Standard  +   PHYSICAL THERAPY:  The patient should begin physical therapy on postoperative   day # 7 and will be advanced to outpatient therapy as soon as   Possible following discharge.  Weight bearing:Toe - Touch left leg  Range of Motion:no motion for 2 weeks     No CPM required due to procedure performed        Additional exercises to be performed are:   avoid motion maintaining the immobilizer locked in hyperextension while not in the CPM     Immediate specific exercises should include:   Gait program should include the above stated weightbearing; in addition: protected gait following symptoms of pain and swelling     Immobilizer if present should be locked @ 0 degrees with gait and allowed to flex to 0 degrees at rest.      Discharge summary:  The patient was discharged to home in [place]  Follow-up as scheduled preoperatively.     Medication(s): Refer to Discharge Medication List         Resume preoperative diet as tolerated     Activity per outpatient discharge instruction sheet        Subjective      Pt reports improvement in his L knee condition, willing to attempt Evon as tolerated     he was  compliant with home exercise program given last session.   Response to previous treatment:good  Functional change: enters PT in t-scope brace unlocked, no assistive device      Pain: 0/10 at rest this AM  Location: left knee          Objective     PO 62    Measurements taken:    Quad lag still evident with SLR, mild gait deviation due to quad weakness   (PROM k' flex 104 deg prone )  (PROM 0/0/90)     Ba received therapeutic exercises to develop strength, endurance, ROM and flexibility for 60  minutes including:    Supine heel slides 1x15  Self ROM k' flex prone + RF and prone 5x;15  Bike x 10' 4.0 seat at 8  QS 1x10:10  Supine SLR 1x10:10 0# no brace  B bridge 2x10:10  LP B 2x15 80#  LP U 2x15 60#  B HR 3x15 80#  St hip abd  3xburn L only red loop band    Seated k' quad karen in chair against table 1x30:06    Pt declined use of CP     Home Exercises Provided and Patient Education Provided     Education provided:   - none this visit     Written Home Exercises Provided: yes .  Exercises were reviewed and Ba was able to demonstrate them prior to the end of the session.  Ba demonstrated good  understanding of the education provided.     See EMR under hand out  for exercises provided 4/6/2020 (add seated chair k' ext karen holds)      Assessment     naomi Rx without increase in sxs,  Quad weakness evident as expected at this time in recovery, however, pt is progressing as indicated     Ba is progressing well towards his goals.   Pt prognosis is Good.     Pt will continue to benefit from skilled outpatient physical therapy to address the deficits listed in the problem list box on initial evaluation, provide pt/family education and to maximize pt's level of independence in the home and community environment.     Pt's spiritual, cultural and educational needs considered and pt agreeable to plan of care and goals.    Anticipated barriers to physical therapy: none    Goals:     Short-Term Goals: 6 weeks  -  The patient will be independent with initial home exercise program.  - The patient is independent with donning/doffing brace to protect tissue healing.  - The patient will be independent amb with crutches on level tile for household distances.  -   Mid Term Goals:  12 weeks   The patient will increase ROM by 15 degrees to perform ambulation and bathing and hygiene with pain < 0/10.  - The patient will increase strength by 1/2 muscle grade to perform ambulation and bathing and hygiene with pain < 0/10.     Long-Term Goals: 36  weeks  - The patient will be independent with home exercise program and symptom management.  - The patient will be independent amb with no assistive device on all surfaces for community distances.  - The patient will increase ROM = to uninvolved knee to perform ambulation, toileting, dressing and recreation/leisure activities  with pain < 0/10.  - The patient will increase strength = to uninvolved knee  to perform ambulation, toileting, dressing and recreation/leisure activities   with pain < 0/10.         Plan     PROM k' flex to 90 deg by 6 weeks     Continue with established Plan of Care towards PT goals with focus on decreasing pain, increasing ROM, strength, neuromuscular control and functional status       Sav Ibarra, PT

## 2020-04-20 ENCOUNTER — CLINICAL SUPPORT (OUTPATIENT)
Dept: REHABILITATION | Facility: HOSPITAL | Age: 20
End: 2020-04-20
Payer: COMMERCIAL

## 2020-04-20 DIAGNOSIS — M62.81 MUSCLE WEAKNESS OF LOWER EXTREMITY: Primary | ICD-10-CM

## 2020-04-20 PROCEDURE — 97110 THERAPEUTIC EXERCISES: CPT | Performed by: PHYSICAL THERAPIST

## 2020-04-20 NOTE — PROGRESS NOTES
Physical Therapy Daily Treatment Note     Visit Date: 4/20/2020    Name: Ba Celestin  Clinic Number: [id number]    Therapy Diagnosis:   Encounter Diagnosis   Name Primary?    Muscle weakness of lower extremity Yes     Physician: Grant Membreno MD     Physician Orders: PT Eval and Treat   Medical Diagnosis: S86.812A (ICD-10-CM) - Patellar tendon rupture, left, initial encounter  Evaluation Date: 2/19/2020  Authorization Period Expiration: 12/31/2020  Plan of Care Certification Period: 11/19/2020  Visit # / Visits authorized:   5 / 20     PROCEDURES(S) PERFORMED:   1. Left  patellar tendon repair, complex (reconstruction)  2.  Left  Arthroscopy, knee, synovectomy, major 09955  3.  Left     DATE OF PROCEDURE: 2/11/2020         Time In:  15:30  Time Out: 16;40  Total Billable Time: 60 minutes     Precautions: Standard  +   PHYSICAL THERAPY:  The patient should begin physical therapy on postoperative   day # 7 and will be advanced to outpatient therapy as soon as   Possible following discharge.  Weight bearing:Toe - Touch left leg  Range of Motion:no motion for 2 weeks     No CPM required due to procedure performed        Additional exercises to be performed are:   avoid motion maintaining the immobilizer locked in hyperextension while not in the CPM     Immediate specific exercises should include:   Gait program should include the above stated weightbearing; in addition: protected gait following symptoms of pain and swelling     Immobilizer if present should be locked @ 0 degrees with gait and allowed to flex to 0 degrees at rest.      Discharge summary:  The patient was [place] to home in [place]  Follow-up as scheduled preoperatively.     Medication(s): Refer to Discharge Medication List         Resume preoperative diet as tolerated     Activity per outpatient discharge instruction sheet        Subjective      Pt reports no new c/o this PM in L knee, willing to attempt Evon as tolerated   Pt notes he has been  walking around house without brace without issues     he was compliant with home exercise program given last session.   Response to previous treatment:good  Functional change: see subjective above       Pain: 0/10 at rest this AM  Location: left knee          Objective     PO 69    Measurements taken:    decreased Quad lag, PROM:    (PROM k' flex 104 deg prone )  (PROM 0/0/90)     Ba received therapeutic exercises to develop strength, endurance, ROM and flexibility for 60  minutes including:    Bike x 15' 5.0 seat at 8  HSS 5x:15  QS 1x10:10  Supine SLR 2x10:05 0#   Seated k' ext 3x10 EOT 0# 90-0   Wall sit 5x:30  LP U 80# 3x10 U 60-0  U HR on LP 3xburn 60#   1D hams green PB 3x10 knees straight   Lunge cook band assist Or 3x10   Self ROM prone k' flex _+ RF and prone 5x:15    Pt declined use of CP     Home Exercises Provided and Patient Education Provided     Education provided:   - none this visit     Written Home Exercises Provided: yes .  Exercises were reviewed and Ba was able to demonstrate them prior to the end of the session.  Ba demonstrated good  understanding of the education provided.     See EMR under hand out  for exercises provided 4/6/2020 (add seated chair k' ext karen holds)      Assessment     naomi Rx without increase in sxs,  Pt progressing as expected, muscle weakness still evident affecting ADLs     Ba is progressing well towards his goals.   Pt prognosis is Good.     Pt will continue to benefit from skilled outpatient physical therapy to address the deficits listed in the problem list box on initial evaluation, provide pt/family education and to maximize pt's level of independence in the home and community environment.     Pt's spiritual, cultural and educational needs considered and pt agreeable to plan of care and goals.    Anticipated barriers to physical therapy: none    Goals:     Short-Term Goals: 6 weeks  - The patient will be independent with initial home exercise  program.  - The patient is independent with donning/doffing brace to protect tissue healing.  - The patient will be independent amb with crutches on level tile for household distances.  -   Mid Term Goals:  12 weeks   The patient will increase ROM by 15 degrees to perform ambulation and bathing and hygiene with pain < 0/10.  - The patient will increase strength by 1/2 muscle grade to perform ambulation and bathing and hygiene with pain < 0/10.     Long-Term Goals: 36  weeks  - The patient will be independent with home exercise program and symptom management.  - The patient will be independent amb with no assistive device on all surfaces for community distances.  - The patient will increase ROM = to uninvolved knee to perform ambulation, toileting, dressing and recreation/leisure activities  with pain < 0/10.  - The patient will increase strength = to uninvolved knee  to perform ambulation, toileting, dressing and recreation/leisure activities   with pain < 0/10.         Plan     PROM k' flex to 90 deg by 6 weeks     Continue with established Plan of Care towards PT goals with focus on decreasing pain, increasing ROM, strength, neuromuscular control and functional status       Sav Ibarra, PT

## 2020-04-27 ENCOUNTER — CLINICAL SUPPORT (OUTPATIENT)
Dept: REHABILITATION | Facility: HOSPITAL | Age: 20
End: 2020-04-27
Attending: ORTHOPAEDIC SURGERY
Payer: COMMERCIAL

## 2020-04-27 DIAGNOSIS — M62.81 MUSCLE WEAKNESS OF LOWER EXTREMITY: Primary | ICD-10-CM

## 2020-04-27 PROCEDURE — 97110 THERAPEUTIC EXERCISES: CPT | Performed by: PHYSICAL THERAPIST

## 2020-04-27 NOTE — PROGRESS NOTES
Physical Therapy Daily Treatment Note     Visit Date: 4/27/2020    Name: Ba Celestin  Clinic Number: [id number]    Therapy Diagnosis:   Encounter Diagnosis   Name Primary?    Muscle weakness of lower extremity Yes     Physician: Grant Membreno MD     Physician Orders: PT Eval and Treat   Medical Diagnosis: S86.812A (ICD-10-CM) - Patellar tendon rupture, left, initial encounter  Evaluation Date: 2/19/2020  Authorization Period Expiration: 12/31/2020  Plan of Care Certification Period: 11/19/2020  Visit # / Visits authorized:   6 / 20     PROCEDURES(S) PERFORMED:   1. Left  patellar tendon repair, complex (reconstruction)  2.  Left  Arthroscopy, knee, synovectomy, major 98423  3.  Left     DATE OF PROCEDURE: 2/11/2020         Time In:  11:30  Time Out: 12;40  Total Billable Time: 60 minutes     Precautions: Standard  +   PHYSICAL THERAPY:  The patient should begin physical therapy on postoperative   day # 7 and will be advanced to outpatient therapy as soon as   Possible following discharge.  Weight bearing:Toe - Touch left leg  Range of Motion:no motion for 2 weeks     No CPM required due to procedure performed        Additional exercises to be performed are:   avoid motion maintaining the immobilizer locked in hyperextension while not in the CPM     Immediate specific exercises should include:   Gait program should include the above stated weightbearing; in addition: protected gait following symptoms of pain and swelling     Immobilizer if present should be locked @ 0 degrees with gait and allowed to flex to 0 degrees at rest.      Discharge summary:  The patient was discharged to home in [place]  Follow-up as scheduled preoperatively.     Medication(s): Refer to Discharge Medication List         Resume preoperative diet as tolerated     Activity per outpatient discharge instruction sheet        Subjective      Pt reports no c/o pain/soreness in L knee this AM, willing to attempt Evon as tolerated     he was  compliant with home exercise program given last session.   Response to previous treatment:good  Functional change: none since LV     Pain: 0/10 at rest this AM  Location: left knee          Objective     PO 76    Measurements taken:    decreased Quad lag persists , PROM:  113 deg prone (improved)  (PROM k' flex 104 deg prone )  (PROM 0/0/90)     Ba received therapeutic exercises to develop strength, endurance, ROM and flexibility for 60  minutes one on one with PT including:    HSS 5x:15  Self RF stretch 5x:15  Self heel slides 2x15  Prone self ROM k' flex + RF and prone 5x:15   Bike x 15' 5.0 seat at 8  QS 1x10:10 2D  TKE 1/2 roll 1x10:05 0#  Supine TKE to SLR 1x10:05 0#   Ball squat 3x15   Seated k' ext 2x10 EOT 0# 90-0  HS seated k' ext 2x10:01 90-60 0#   LP U 100# 3x10 U 60-0  B HR on LP 3xburn 100#   st ham curls 2x10:05 5#        Pt declined use of CP     Home Exercises Provided and Patient Education Provided     Education provided:   - none this visit     Written Home Exercises Provided: yes .  Exercises were reviewed and Ba was able to demonstrate them prior to the end of the session.  Ba demonstrated good  understanding of the education provided.     See EMR under hand out  for exercises provided 4/27/2020 (add seated  k' ext AROM)      Assessment     naomi Rx without increase in sxs,  Quad lag persists indicating muscle weakness in quad overall      Ba is progressing well towards his goals.   Pt prognosis is Good.     Pt will continue to benefit from skilled outpatient physical therapy to address the deficits listed in the problem list box on initial evaluation, provide pt/family education and to maximize pt's level of independence in the home and community environment.     Pt's spiritual, cultural and educational needs considered and pt agreeable to plan of care and goals.    Anticipated barriers to physical therapy: none    Goals:     Short-Term Goals: 6 weeks  - The patient will be  independent with initial home exercise program.  - The patient is independent with donning/doffing brace to protect tissue healing.  - The patient will be independent amb with crutches on level tile for household distances.  -   Mid Term Goals:  12 weeks   The patient will increase ROM by 15 degrees to perform ambulation and bathing and hygiene with pain < 0/10.  - The patient will increase strength by 1/2 muscle grade to perform ambulation and bathing and hygiene with pain < 0/10.     Long-Term Goals: 36  weeks  - The patient will be independent with home exercise program and symptom management.  - The patient will be independent amb with no assistive device on all surfaces for community distances.  - The patient will increase ROM = to uninvolved knee to perform ambulation, toileting, dressing and recreation/leisure activities  with pain < 0/10.  - The patient will increase strength = to uninvolved knee  to perform ambulation, toileting, dressing and recreation/leisure activities   with pain < 0/10.         Plan     Focus on increasing k' flex ROM and increasing quad strength     Continue with established Plan of Care towards PT goals with focus on decreasing pain, increasing ROM, strength, neuromuscular control and functional status       Sav Ibarra, PT

## 2020-05-04 ENCOUNTER — TELEPHONE (OUTPATIENT)
Dept: SPORTS MEDICINE | Facility: CLINIC | Age: 20
End: 2020-05-04

## 2020-05-04 NOTE — TELEPHONE ENCOUNTER
Unable to reach patient. Message sent to PT about pt missing his appt.      ----- Message from Raheem Danielle sent at 5/4/2020 12:36 PM CDT -----  Contact: self   Pt called stating that he wont make his appt on today for 3:30 he's asking for a callback to reschedule         Contact info

## 2020-05-18 ENCOUNTER — CLINICAL SUPPORT (OUTPATIENT)
Dept: REHABILITATION | Facility: HOSPITAL | Age: 20
End: 2020-05-18
Payer: COMMERCIAL

## 2020-05-18 DIAGNOSIS — M62.81 MUSCLE WEAKNESS OF LOWER EXTREMITY: Primary | ICD-10-CM

## 2020-05-18 PROCEDURE — 97110 THERAPEUTIC EXERCISES: CPT | Performed by: PHYSICAL THERAPIST

## 2020-05-18 NOTE — PROGRESS NOTES
Physical Therapy Daily Treatment Note     Visit Date: 5/18/2020    Name: Ba Celestin  Clinic Number: [id number]    Therapy Diagnosis:   Encounter Diagnosis   Name Primary?    Muscle weakness of lower extremity Yes     Physician: Grant Membreno MD     Physician Orders: PT Eval and Treat   Medical Diagnosis: S86.812A (ICD-10-CM) - Patellar tendon rupture, left, initial encounter  Evaluation Date: 2/19/2020  Authorization Period Expiration: 12/31/2020  Plan of Care Certification Period: 11/19/2020  Visit # / Visits authorized:   7 / 20     PROCEDURES(S) PERFORMED:   1. Left  patellar tendon repair, complex (reconstruction)  2.  Left  Arthroscopy, knee, synovectomy, major 82627  3.  Left     DATE OF PROCEDURE: 2/11/2020         Time In:  10:30  Time Out: 11;15  Total Billable Time: 45 minutes     Precautions: Standard  +   PHYSICAL THERAPY:  The patient should begin physical therapy on postoperative   day # 7 and will be advanced to outpatient therapy as soon as   Possible following discharge.  Weight bearing:Toe - Touch left leg  Range of Motion:no motion for 2 weeks     No CPM required due to procedure performed        Additional exercises to be performed are:   avoid motion maintaining the immobilizer locked in hyperextension while not in the CPM     Immediate specific exercises should include:   Gait program should include the above stated weightbearing; in addition: protected gait following symptoms of pain and swelling     Immobilizer if present should be locked @ 0 degrees with gait and allowed to flex to 0 degrees at rest.      Discharge summary:  The patient was discharged to home in [place]  Follow-up as scheduled preoperatively.     Medication(s): Refer to Discharge Medication List         Resume preoperative diet as tolerated     Activity per outpatient discharge instruction sheet        Subjective      Pt reports that he has been doing HEP, purchased a stationary bike with good results     he was  compliant with home exercise program given last session.   Response to previous treatment:good  Functional change: can walk x 2 miles      Pain: 0/10 at rest this AM  Location: left knee          Objective     PO 97    Measurements taken:   MMT quad 3+ - 4-/5, PROM k' flex 119 deg  ( PROM:  113 deg prone (improved))  (PROM k' flex 104 deg prone )  (PROM 0/0/90)     Ba received therapeutic exercises to develop strength, endurance, ROM and flexibility for 45  minutes one on one with PT including:    ET x 5'   Supine TKE to SLR 1x10:05 0#   Wall sit 5x:30 bias to L   LP U 120# 3x10 U 60-0  U HR st  3xburn    HS seated k' ext 3x10 90-0 2.5#   HS seated ham curls 3x10 20# U   Self ROM k' flex prone 5x:15    Pt declined use of CP     Home Exercises Provided and Patient Education Provided     Education provided:   - none this visit     Written Home Exercises Provided: yes .  Exercises were reviewed and Ba was able to demonstrate them prior to the end of the session.  Ba demonstrated good  understanding of the education provided.     See EMR under hand out  for exercises provided 4/27/2020 (add seated  k' ext AROM)      Assessment     naomi Rx without increase in sxs,  Continued LOM k' flexion and Quad weakness but improving     Ba is progressing well towards his goals.   Pt prognosis is Good.     Pt will continue to benefit from skilled outpatient physical therapy to address the deficits listed in the problem list box on initial evaluation, provide pt/family education and to maximize pt's level of independence in the home and community environment.     Pt's spiritual, cultural and educational needs considered and pt agreeable to plan of care and goals.    Anticipated barriers to physical therapy: none    Goals:     Short-Term Goals: 6 weeks  - The patient will be independent with initial home exercise program.  - The patient is independent with donning/doffing brace to protect tissue healing.  - The  patient will be independent amb with crutches on level tile for household distances.  -   Mid Term Goals:  12 weeks   The patient will increase ROM by 15 degrees to perform ambulation and bathing and hygiene with pain < 0/10.  - The patient will increase strength by 1/2 muscle grade to perform ambulation and bathing and hygiene with pain < 0/10.     Long-Term Goals: 36  weeks  - The patient will be independent with home exercise program and symptom management.  - The patient will be independent amb with no assistive device on all surfaces for community distances.  - The patient will increase ROM = to uninvolved knee to perform ambulation, toileting, dressing and recreation/leisure activities  with pain < 0/10.  - The patient will increase strength = to uninvolved knee  to perform ambulation, toileting, dressing and recreation/leisure activities   with pain < 0/10.         Plan     Focus on increasing k' flex ROM and increasing quad strength     Continue with established Plan of Care towards PT goals with focus on decreasing pain, increasing ROM, strength, neuromuscular control and functional status       Sav Ibarra, PT

## 2020-05-25 ENCOUNTER — CLINICAL SUPPORT (OUTPATIENT)
Dept: REHABILITATION | Facility: HOSPITAL | Age: 20
End: 2020-05-25
Attending: ORTHOPAEDIC SURGERY
Payer: COMMERCIAL

## 2020-05-25 DIAGNOSIS — M62.81 MUSCLE WEAKNESS OF LOWER EXTREMITY: Primary | ICD-10-CM

## 2020-05-25 PROCEDURE — 97110 THERAPEUTIC EXERCISES: CPT | Performed by: PHYSICAL THERAPIST

## 2020-05-25 NOTE — PROGRESS NOTES
"  Physical Therapy Daily Treatment Note     Visit Date: 5/25/2020    Name: Ba Celestin  Clinic Number: [id number]    Therapy Diagnosis:   Encounter Diagnosis   Name Primary?    Muscle weakness of lower extremity Yes     Physician: Grant Membreno MD     Physician Orders: PT Eval and Treat   Medical Diagnosis: S86.812A (ICD-10-CM) - Patellar tendon rupture, left, initial encounter  Evaluation Date: 2/19/2020  Authorization Period Expiration: 12/31/2020  Plan of Care Certification Period: 11/19/2020  Visit # / Visits authorized:  8 / 20     PROCEDURES(S) PERFORMED:   1. Left  patellar tendon repair, complex (reconstruction)  2.  Left  Arthroscopy, knee, synovectomy, major 85999  3.  Left     DATE OF PROCEDURE: 2/11/2020         Time In:  15:45  Time Out: 16;30  Total Billable Time: 45 minutes     Precautions: Standard  +   PHYSICAL THERAPY:  The patient should begin physical therapy on postoperative   day # 7 and will be advanced to outpatient therapy as soon as   Possible following discharge.  Weight bearing:Toe - Touch left leg  Range of Motion:no motion for 2 weeks     No CPM required due to procedure performed        Additional exercises to be performed are:   avoid motion maintaining the immobilizer locked in hyperextension while not in the CPM     Immediate specific exercises should include:   Gait program should include the above stated weightbearing; in addition: protected gait following symptoms of pain and swelling     Immobilizer if present should be locked @ 0 degrees with gait and allowed to flex to 0 degrees at rest.      Discharge summary:  The patient was discharged to home in [place]  Follow-up as scheduled preoperatively.     Medication(s): Refer to Discharge Medication List         Resume preoperative diet as tolerated     Activity per outpatient discharge instruction sheet        Subjective      Pt reports going to beach yesterday, did well on sand and in water "It felt a little funny after I got " "out of the water but didn't hurt"      he was compliant with home exercise program given last session.   Response to previous treatment:good  Functional change: see subjective above      Pain: 0/10   Location: left knee        Objective         Measurements taken:   None taken this PM   (MMT quad 3+ - 4-/5, PROM k' flex 119 deg)  ( PROM:  113 deg prone (improved))  (PROM k' flex 104 deg prone )  (PROM 0/0/90)     Ba received therapeutic exercises to develop strength, endurance, ROM and flexibility for 45  minutes one on one with PT including:    Supine TKE 1x10:10 0#  Supine TKE :01 to SLR 1x10:05 0#   Wall sit 5x30 bias to L   LP U 120# 3x10 U 60-0  B HR on LP 3x15 120#     HS seated k' ext 3x10 90-0 2.5#   HS seated ham curls 3x10 20# U   SLS 5x:15  LSU 3" 3x10 up only   Self ROM k' flex prone 5x:15    Pt declined use of CP     Home Exercises Provided and Patient Education Provided     Education provided:   - none this visit     Written Home Exercises Provided: yes .  Exercises were reviewed and Ba was able to demonstrate them prior to the end of the session.  Ba demonstrated good  understanding of the education provided.     See EMR under hand out  for exercises provided 4/27/2020 (add seated  k' ext AROM)      Assessment     naomi Rx without increase in sxs,  Pt with significant difficulty unlocking the knee to maintain SLS and perform LSU, unable to go up/down stairs   Continued LOM k' flexion and Quad weakness but improving     Ba is progressing well towards his goals.   Pt prognosis is Good.     Pt will continue to benefit from skilled outpatient physical therapy to address the deficits listed in the problem list box on initial evaluation, provide pt/family education and to maximize pt's level of independence in the home and community environment.     Pt's spiritual, cultural and educational needs considered and pt agreeable to plan of care and goals.    Anticipated barriers to " physical therapy: none    Goals:     Short-Term Goals: 6 weeks  - The patient will be independent with initial home exercise program.  - The patient is independent with donning/doffing brace to protect tissue healing.  - The patient will be independent amb with crutches on level tile for household distances.  -   Mid Term Goals:  12 weeks   The patient will increase ROM by 15 degrees to perform ambulation and bathing and hygiene with pain < 0/10.  - The patient will increase strength by 1/2 muscle grade to perform ambulation and bathing and hygiene with pain < 0/10.     Long-Term Goals: 36  weeks  - The patient will be independent with home exercise program and symptom management.  - The patient will be independent amb with no assistive device on all surfaces for community distances.  - The patient will increase ROM = to uninvolved knee to perform ambulation, toileting, dressing and recreation/leisure activities  with pain < 0/10.  - The patient will increase strength = to uninvolved knee  to perform ambulation, toileting, dressing and recreation/leisure activities   with pain < 0/10.         Plan     Focus on increasing k' flex ROM and increasing quad strength     Continue with established Plan of Care towards PT goals with focus on decreasing pain, increasing ROM, strength, neuromuscular control and functional status       Sav Ibarra, PT

## 2020-06-15 PROBLEM — Z09 SURGERY FOLLOW-UP EXAMINATION: Status: RESOLVED | Noted: 2018-11-12 | Resolved: 2020-06-15

## 2020-08-23 ENCOUNTER — TELEPHONE (OUTPATIENT)
Dept: SPORTS MEDICINE | Facility: CLINIC | Age: 20
End: 2020-08-23

## 2020-08-23 NOTE — TELEPHONE ENCOUNTER
Spoke to the  patient explaining that Dr. Membreno will not be in clinic tomorrow due to the impending weather condition. Canceled the appointment. Rescheduled to 9/2 at 9a

## 2020-09-07 ENCOUNTER — HOSPITAL ENCOUNTER (EMERGENCY)
Facility: HOSPITAL | Age: 20
Discharge: HOME | End: 2020-09-07
Attending: EMERGENCY MEDICINE
Payer: COMMERCIAL

## 2020-09-07 VITALS
HEART RATE: 57 BPM | DIASTOLIC BLOOD PRESSURE: 79 MMHG | BODY MASS INDEX: 41.52 KG/M2 | TEMPERATURE: 98 F | SYSTOLIC BLOOD PRESSURE: 145 MMHG | HEIGHT: 70 IN | RESPIRATION RATE: 16 BRPM | WEIGHT: 290 LBS | OXYGEN SATURATION: 100 %

## 2020-09-07 DIAGNOSIS — H60.502 ACUTE OTITIS EXTERNA OF LEFT EAR, UNSPECIFIED TYPE: Primary | ICD-10-CM

## 2020-09-07 PROCEDURE — 99283 EMERGENCY DEPT VISIT LOW MDM: CPT

## 2020-09-07 RX ORDER — NEOMYCIN SULFATE, POLYMYXIN B SULFATE AND HYDROCORTISONE 10; 3.5; 1 MG/ML; MG/ML; [USP'U]/ML
4 SUSPENSION/ DROPS AURICULAR (OTIC) 3 TIMES DAILY
Qty: 10 ML | Refills: 0 | Status: SHIPPED | OUTPATIENT
Start: 2020-09-07 | End: 2021-11-08

## 2020-09-07 NOTE — ED PROVIDER NOTES
Encounter Date: 9/7/2020       History     Chief Complaint   Patient presents with    Otalgia     x 2-3 days ago ( left ) recently went swimming      18 y/o M presenting to ED with 3 day onset left ear pain. He does admit to swimming a few days prior to onset of symptoms. He has tried OTC drops with no improvement. No drainage, fever, chills, nausea, vomiting, facial swelling, sore throat, drooling, congestion, appetite changes. No other acute complaints at this time.     The history is provided by the patient.     Review of patient's allergies indicates:   Allergen Reactions    Penicillins Rash     Past Medical History:   Diagnosis Date    Asthma      Past Surgical History:   Procedure Laterality Date    APPENDECTOMY      ARTHROSCOPIC CHONDROPLASTY OF KNEE JOINT Left 12/20/2019    Procedure: ARTHROSCOPY, KNEE, WITH CHONDROPLASTY;  Surgeon: Grant Membreno MD;  Location: [address];  Service: Orthopedics;  Laterality: Left;    FIXATION OF SYNDESMOSIS OF ANKLE Right 10/30/2018    Procedure: FIXATION, SYNDESMOSIS, ANKLE;  Surgeon: Grant Membreno MD;  Location: [facility];  Service: Orthopedics;  Laterality: Right;  GENERAL  REGIONAL W/O CATHETER -ADDUCTOR    KNEE ARTHROSCOPY W/ ACL RECONSTRUCTION Left 12/20/2019    Procedure: RECONSTRUCTION, KNEE, ACL, ARTHROSCOPIC;  Surgeon: Grant Membreno MD;  Location: [address];  Service: Orthopedics;  Laterality: Left;  Adductor,Clonidine/Epi/Ketorolac/Ropivacaine Injection 30cc,ANIBAL    KNEE ARTHROSCOPY W/ DEBRIDEMENT  2/11/2020    Procedure: ARTHROSCOPY, KNEE, WITH DEBRIDEMENT;  Surgeon: Grant Membreno MD;  Location: [facility] OR;  Service: Orthopedics;;    PATELLAR TENDON REPAIR Left 2/11/2020    Procedure: REPAIR, TENDON, PATELLAR;  Surgeon: Grant Membreno MD;  Location: [facility] OR;  Service: Orthopedics;  Laterality: Left;    RECONSTRUCTION OF LIGAMENT Left 12/20/2019    Procedure: RECONSTRUCTION, LIGAMENT,KNEE, EXTRA ARTICULAR;  Surgeon: Grant Membreno MD;   Location: [address];  Service: Orthopedics;  Laterality: Left;  RECONSTRUCTION, LIGAMENT,Knee,extra-articular    REPAIR OF MENISCUS OF KNEE Left 12/20/2019    Procedure: REPAIR, MENISCUS, KNEE;  Surgeon: Grant Membreno MD;  Location: [facility] OR [address] [place] [address];  Service: Orthopedics;  Laterality: Left;    SYNOVECTOMY OF KNEE Left 12/20/2019    Procedure: SYNOVECTOMY, KNEE;  Surgeon: Grant Membreno MD;  Location: [facility] OR [address] [place] [address];  Service: Orthopedics;  Laterality: Left;    TONSILLECTOMY       No family history on file.  Social History     Tobacco Use    Smoking status: Never Smoker    Smokeless tobacco: Never Used   Substance Use Topics    Alcohol use: No    Drug use: No     Review of Systems   Constitutional: Negative for activity change, appetite change, chills and fever.   HENT: Positive for ear pain. Negative for congestion, drooling, ear discharge, facial swelling, sinus pain, sore throat and voice change.    Eyes: Negative for visual disturbance.   Respiratory: Negative for cough and shortness of breath.    Cardiovascular: Negative for chest pain.   Gastrointestinal: Negative for abdominal pain, nausea and vomiting.   Neurological: Negative for syncope, weakness, light-headedness and headaches.       Physical Exam     Initial Vitals [09/07/20 1336]   BP Pulse Resp Temp SpO2   (!) 145/79 (!) 57 16 98 °F (36.7 °C) 100 %      MAP       --         Physical Exam    Nursing note and vitals reviewed.  Constitutional: He appears well-developed and well-nourished. He is cooperative.  Non-toxic appearance. He does not have a sickly appearance. He does not appear ill. No distress.   HENT:   Head: Normocephalic and atraumatic.   Right Ear: Tympanic membrane, external ear and ear canal normal. No swelling or tenderness.   Left Ear: There is swelling and tenderness.   Mouth/Throat: Uvula is midline and oropharynx is clear and moist.   Left ear tragus tenderness. No mastoid tenderness. Left canal erythema and swelling.  TM intact   Eyes: Conjunctivae and EOM are normal.   Neck: Normal range of motion. Neck supple.   Cardiovascular: Normal rate, regular rhythm and normal heart sounds.   Pulmonary/Chest: Effort normal and breath sounds normal.   Abdominal: Soft. Normal appearance.   Musculoskeletal: No tenderness.   Neurological: He is alert and oriented to person, place, and time. GCS score is 15. GCS eye subscore is 4. GCS verbal subscore is 5. GCS motor subscore is 6.   Skin: Skin is warm and dry. No rash noted.   Psychiatric: He has a normal mood and affect. His speech is normal and behavior is normal. Judgment and thought content normal. Cognition and memory are normal.         ED Course   Procedures  Labs Reviewed - No data to display       Imaging Results    None          Medical Decision Making:   Differential Diagnosis:   [place] externa, otitis media, irritant, foreign body, infection, viral  ED Management:  Patient presents with 3 day history of left ear pain and tenderness, onset two days after swimming. No associated fever, chills, sore throat, mastoid tenderness. On exam, left ear canal significant swelling and erythema. TM appeared intact. Ear wick placed 2/2 significant swelling. Prescription for cortisporin. Encouraged to monitor sx closely, follow up with PCP/ED if needed. Patient understands instructions and agrees with plan.                                  Clinical Impression:       ICD-10-CM ICD-9-CM   1. Acute otitis externa of left ear, unspecified type  H60.502 380.10             ED Disposition Condition    Discharge Stable        ED Prescriptions     Medication Sig Dispense Start Date End Date Auth. Provider    neomycin-polymyxin-hydrocortisone (CORTISPORIN) 3.5-10,000-1 mg/mL-unit/mL-% otic suspension Place 4 drops into the left ear 3 (three) times daily. 10 mL 9/7/2020  Austin Guthrie PA-C        Follow-up Information     Follow up With Specialties Details Why Contact Info    Deb Mello MD Pediatrics  In 3 days As needed [address]   [facility] [address]  [facility] [address]  [phone]                                       Austin Guthrie PA-C  09/07/20 3131

## 2020-09-07 NOTE — DISCHARGE INSTRUCTIONS
Ear drops as instructed, tylenol and/or ibuprofen as needed.   Return to closest emergency department if symptoms do not improve, change, worsen, or for any new concerns.

## 2020-09-07 NOTE — FIRST PROVIDER EVALUATION
Emergency Department TeleTriage Encounter Note      CHIEF COMPLAINT    Chief Complaint   Patient presents with    Otalgia     x 2-3 days ago ( left ) recently went swimming        VITAL SIGNS   Initial Vitals [09/07/20 1336]   BP Pulse Resp Temp SpO2   (!) 145/79 (!) 57 16 98 °F (36.7 °C) 100 %      MAP       --            ALLERGIES    Review of patient's allergies indicates:   Allergen Reactions    Penicillins Rash       PROVIDER TRIAGE NOTE  Patient presenting to the ER for evaluation of left ear pain for the last 2 days.  He is currently not on antibiotics.     No orders placed at this time. Care will be transferred to an alternate provider when patient is roomed for a full evaluation. Any additional orders and the final disposition will be determined by that provider.        ORDERS  Labs Reviewed - No data to display    ED Orders (720h ago, onward)    None            Virtual Visit Note: The provider triage portion of this emergency department evaluation and documentation was performed via [employer], a HIPAA-compliant telemedicine application, in concert with a tele-presenter in the room. A face to face patient evaluation with one of my colleagues will occur once the patient is placed in an emergency department room.      DISCLAIMER: This note was prepared with [employer] voice recognition transcription software. Garbled syntax, mangled pronouns, and other bizarre constructions may be attributed to that software system.

## 2020-10-19 ENCOUNTER — TELEPHONE (OUTPATIENT)
Dept: SPORTS MEDICINE | Facility: CLINIC | Age: 20
End: 2020-10-19

## 2020-10-19 NOTE — TELEPHONE ENCOUNTER
Spoke to the patient and scheduled an appt for 2:30pm on Wednesday with Dr. Membreno    ----- Message from Marcella Manzo sent at 10/19/2020 11:42 AM CDT -----  Regarding: Appointment  Type Appointment Request        Name of Caller:Patient states need for this week   patient  states experiencing a little stiffness with left knee  need appointment for Tuesday Wed or Thursday this week     When is the first available appointment?#  Symptoms:  Best Call Back Number:  [phone]  Additional Information:

## 2020-10-20 NOTE — PROGRESS NOTES
Subjective:          Chief Complaint: Ba Celestin is a 19 y.o. male who had concerns including Pain of the Left Knee.    Pt presents post op visit s/p below. 0/10 pain today. He has stopped physical therapy for past two months. He feels that his strength and ROM are lacking.   He is no longer taking any medications. He cannot lunge or do body weight exercises.         DATE OF PROCEDURE: 2/11/2020     ATTENDING SURGEON: Surgeon(s) and Role:     * Grant Membreno MD - Primary     ASSISTANTS:  Walker Huitron MD-Resident  Jacy Whittaker PA-C - Assistant     PREOPERATIVE DIAGNOSIS:  Left  Internal derangement knee M23.90, Tear, Lateral meniscus, acute S83.289A and complex patellar tendon repair     POSTOPERATIVE DIAGNOSIS:   Left  Internal derangement knee M23.90, Synovitis M65.9 and Complex patellar tendon tear     PROCEDURES(S) PERFORMED:   1. Left  patellar tendon repair, complex (reconstruction)  2.  Left  Arthroscopy, knee, synovectomy, major 39776  3.  Left  Arthroscopy, debridement/shaving of articular cartilage (chondroplasty) 57756     DATE OF PROCEDURE: 12/20/2019     ATTENDING SURGEON: Surgeon(s) and Role:     * Grant Membreno MD - Primary     Assistants:  MD Celestino Villa PA-C        PREOPERATIVE DIAGNOSIS:  Left  Synovitis M65.9, Tear, Lateral meniscus, acute S83.289A and Anterior Cruciate Ligament Tear S83.510     POSTOPERATIVE DIAGNOSIS:   Left  Synovitis M65.9, Tear, Lateral meniscus, acute S83.289A and Anterior Cruciate Ligament Tear S83.510     PROCEDURES(S) PERFORMED:   1. Left  Arthroscopy, anterior cruciate ligament reconstruction 82305   2. Left  Extra-Articular ligament reconstruction (ALL), 73830  3.  Left  Arthroscopy, with meniscus repair (medial OR lateral) 12299, Lateral  4.  Left  Arthroscopy, knee, synovectomy, limited 16887             Review of Systems   Constitution: Negative. Negative for chills, fever, weight gain and weight loss.   HENT: Negative.  Negative  for congestion and sore throat.    Eyes: Negative.  Negative for blurred vision and double vision.   Cardiovascular: Negative.  Negative for chest pain, leg swelling and palpitations.   Respiratory: Negative.  Negative for cough and shortness of breath.    Endocrine: Negative.    Hematologic/Lymphatic: Negative.  Does not bruise/bleed easily.   Skin: Positive for color change. Negative for itching, poor wound healing and rash.   Musculoskeletal: Positive for joint pain, joint swelling and stiffness. Negative for back pain, muscle weakness and myalgias.   Gastrointestinal: Negative.  Negative for abdominal pain, constipation, diarrhea, nausea and vomiting.   Genitourinary: Negative.  Negative for frequency and hematuria.   Neurological: Negative.  Negative for dizziness, headaches, numbness, paresthesias and sensory change.   Psychiatric/Behavioral: Negative.  Negative for altered mental status and depression. The patient is not nervous/anxious.    Allergic/Immunologic: Negative.  Negative for hives.                   Objective:        General: Ba is well-developed, well-nourished, appears stated age, in no acute distress, alert and oriented to time, place and person.     General    Vitals reviewed.  Constitutional: He is oriented to person, place, and time. He appears well-developed and well-nourished. No distress.   HENT:   Mouth/Throat: No oropharyngeal exudate.   Eyes: Right eye exhibits no discharge. Left eye exhibits no discharge.   Neck: Normal range of motion.   Cardiovascular: Normal rate and regular rhythm.    Pulmonary/Chest: Effort normal and breath sounds normal. No respiratory distress.   Neurological: He is alert and oriented to person, place, and time. He has normal reflexes. No cranial nerve deficit. Coordination normal.   Psychiatric: He has a normal mood and affect. His behavior is normal. Judgment and thought content normal.     General Musculoskeletal Exam   Gait: antalgic and normal        Right Knee Exam     Inspection   Erythema: absent  Scars: absent  Swelling: absent  Effusion: absent  Deformity: absent  Bruising: absent    Tenderness   The patient is experiencing no tenderness.     Range of Motion   Extension: 0   Flexion: 130     Tests   Meniscus   Lynn:  Medial - negative Lateral - negative  Ligament Examination Lachman: normal (-1 to 2mm) PCL-Posterior Drawer: normal (0 to 2mm)     MCL - Valgus: normal (0 to 2mm)  LCL - Varus: normalPivot Shift: normal (Equal)Reverse Pivot Shift: normal (Equal)Dial Test at 30 degrees: normal (< 5 degrees)Dial Test at 90 degrees: normal (< 5 degrees)  Posterior Sag Test: negative  Posterolateral Corner: unstable (>15 degrees difference)  Patella   Patellar apprehension: negative  Passive Patellar Tilt: neutral  Patellar Tracking: normal  Patellar Glide (quadrants): Lateral - 1   Medial - 2  Q-Angle at 90 degrees: normal  Patellar Grind: negative  J-Sign: none    Other   Meniscal Cyst: absent  Popliteal (Baker's) Cyst: absent  Sensation: normal    Left Knee Exam     Inspection   Erythema: absent  Scars: present  Effusion: absent  Deformity: absent  Bruising: absent    Range of Motion   Extension:  -5 (LAG 5 degrees) abnormal   Flexion:  130 (125) normal     Tests   Meniscus   Lynn:  Medial - negative Lateral - negative  Stability Lachman: normal (-1 to 2mm) PCL-Posterior Drawer: normal (0 to 2mm)  MCL - Valgus: normal (0 to 2mm)  LCL - Varus: normal (0 to 2mm)Pivot Shift: normal (Equal)Reverse Pivot Shift: normal (Equal)Dial Test at 30 degrees: normal (< 5 degrees)Dial Test at 90 degrees: normal (< 5 degrees)  Posterior Sag Test: negative  Posterolateral Corner: unstable (>15 degrees difference)  Patella   Patellar apprehension: positive  Passive Patellar Tilt: neutral  Patellar Tracking: normal  Patellar Glide (Quadrants): Lateral - 1 Medial - 2  Q-Angle at 90 degrees: normal  Patellar Grind: negative  J-Sign: J sign absent    Other   Meniscal  Cyst: absent  Popliteal (Baker's) Cyst: absent  Sensation: normal    Comments:  Small amount of bloody drainage from very distal incision.    Right Hip Exam     Tests   Enid: negative  Left Hip Exam     Tests   Enid: negative          Muscle Strength   Right Lower Extremity   Hip Abduction: 5/5   Quadriceps:  4/5   Hamstrin/5   Left Lower Extremity   Hip Abduction: 5/5   Quadriceps:  4/5   Hamstrin/5     Reflexes     Left Side  Achilles:  2+  Quadriceps:  2+    Right Side   Achilles:  2+  Quadriceps:  2+    Vascular Exam     Right Pulses  Dorsalis Pedis:      2+  Posterior Tibial:      2+        Left Pulses  Dorsalis Pedis:      2+  Posterior Tibial:      2+          Radiographic Findings:    Postoperative changes are again identified relating to a prior anterior cruciate ligament reconstruction procedure.  The patella now lies in normal relationship with the femur following the repair of the patellar tendon rupture documented on the MR exam of 02/10/2020.  No evidence of superimposed recent fracture, lytic destructive process, or other detrimental change since the preoperative exam of 02/10/2020 noted.  Infrapatellar soft tissue swelling has decreased since that time.    Xrays of the left knee were reviewed by me today. These findings were discussed and reviewed with the patient.    XRays done on 10/21/2020        Assessment:       Encounter Diagnoses   Name Primary?    Pain in both knees, unspecified chronicity Yes    Patellar tendon rupture, left, sequela     S/P left knee arthroscopy           Plan:       1. IKDC, SF-12 and KOOS was not filled out today in clinic.     RTC in 6 weeks with Dr. Grant Membreno .  Patient will not fill out IKDC, SF-12, KOOS on return.     2.  Patient was instructed to keep T scoping mobilizer on locked out in full extension -10 degrees at all times  - advance to 50%    3.  Continue  mg once daily and Celebrex 200 mg BID for 2 wks.    4. Resume PT with Sav Atkins  Full WBAT  Weight reduction from 290 lbs. To 250 lbs. For return to wrestling/football  CORE and gluteal program    5. Nutritional Consult    6. Performance Strength and conditional consult     Has 5-10 degree LAG                    Patient questionnaires may have been collected.

## 2020-10-21 ENCOUNTER — OFFICE VISIT (OUTPATIENT)
Dept: SPORTS MEDICINE | Facility: CLINIC | Age: 20
End: 2020-10-21
Payer: COMMERCIAL

## 2020-10-21 ENCOUNTER — HOSPITAL ENCOUNTER (OUTPATIENT)
Dept: RADIOLOGY | Facility: HOSPITAL | Age: 20
Discharge: HOME | End: 2020-10-21
Attending: ORTHOPAEDIC SURGERY
Payer: COMMERCIAL

## 2020-10-21 VITALS
DIASTOLIC BLOOD PRESSURE: 60 MMHG | WEIGHT: 296 LBS | HEART RATE: 57 BPM | HEIGHT: 70 IN | BODY MASS INDEX: 42.37 KG/M2 | SYSTOLIC BLOOD PRESSURE: 113 MMHG

## 2020-10-21 DIAGNOSIS — S86.812S PATELLAR TENDON RUPTURE, LEFT, SEQUELA: ICD-10-CM

## 2020-10-21 DIAGNOSIS — M25.561 PAIN IN BOTH KNEES, UNSPECIFIED CHRONICITY: Primary | ICD-10-CM

## 2020-10-21 DIAGNOSIS — M25.562 PAIN IN BOTH KNEES, UNSPECIFIED CHRONICITY: Primary | ICD-10-CM

## 2020-10-21 DIAGNOSIS — M25.561 PAIN IN BOTH KNEES, UNSPECIFIED CHRONICITY: ICD-10-CM

## 2020-10-21 DIAGNOSIS — M25.562 PAIN IN BOTH KNEES, UNSPECIFIED CHRONICITY: ICD-10-CM

## 2020-10-21 DIAGNOSIS — Z98.890 S/P LEFT KNEE ARTHROSCOPY: ICD-10-CM

## 2020-10-21 PROCEDURE — 73564 PR  X-RAY KNEE 4+ VIEW: ICD-10-PCS | Mod: 26,LT,, | Performed by: RADIOLOGY

## 2020-10-21 PROCEDURE — 3008F PR BODY MASS INDEX (BMI) DOCUMENTED: ICD-10-PCS | Mod: CPTII,S$GLB,, | Performed by: ORTHOPAEDIC SURGERY

## 2020-10-21 PROCEDURE — 3008F BODY MASS INDEX DOCD: CPT | Mod: CPTII,S$GLB,, | Performed by: ORTHOPAEDIC SURGERY

## 2020-10-21 PROCEDURE — 99213 PR OFFICE/OUTPT VISIT, EST, LEVL III, 20-29 MIN: ICD-10-PCS | Mod: S$GLB,,, | Performed by: ORTHOPAEDIC SURGERY

## 2020-10-21 PROCEDURE — 99999 PR PBB SHADOW E&M-EST. PATIENT-LVL V: ICD-10-PCS | Mod: PBBFAC,,, | Performed by: ORTHOPAEDIC SURGERY

## 2020-10-21 PROCEDURE — 99999 PR PBB SHADOW E&M-EST. PATIENT-LVL V: CPT | Mod: PBBFAC,,, | Performed by: ORTHOPAEDIC SURGERY

## 2020-10-21 PROCEDURE — 73564 X-RAY EXAM KNEE 4 OR MORE: CPT | Mod: 26,RT,, | Performed by: RADIOLOGY

## 2020-10-21 PROCEDURE — 73564 X-RAY EXAM KNEE 4 OR MORE: CPT | Mod: 26,LT,, | Performed by: RADIOLOGY

## 2020-10-21 PROCEDURE — 99213 OFFICE O/P EST LOW 20 MIN: CPT | Mod: S$GLB,,, | Performed by: ORTHOPAEDIC SURGERY

## 2020-10-21 PROCEDURE — 73564 X-RAY EXAM KNEE 4 OR MORE: CPT | Mod: TC,50

## 2020-10-23 ENCOUNTER — CLINICAL SUPPORT (OUTPATIENT)
Dept: REHABILITATION | Facility: HOSPITAL | Age: 20
End: 2020-10-23
Attending: ORTHOPAEDIC SURGERY
Payer: COMMERCIAL

## 2020-10-23 DIAGNOSIS — R53.1 WEAKNESS: ICD-10-CM

## 2020-10-23 PROCEDURE — 97161 PT EVAL LOW COMPLEX 20 MIN: CPT | Performed by: PHYSICAL THERAPIST

## 2020-10-23 PROCEDURE — 97110 THERAPEUTIC EXERCISES: CPT | Performed by: PHYSICAL THERAPIST

## 2020-10-23 NOTE — PLAN OF CARE
[facility]  Physical Therapy Initial Evaluation    Name: Ba Celestin  Clinic Number: [id number]    Therapy Diagnosis:   Encounter Diagnosis   Name Primary?    Weakness      Physician: Grant Membreno MD    Physician Orders: PT Eval and Treat   Medical Diagnosis: S86.812S (ICD-10-CM) - Patellar tendon rupture   Evaluation Date: 10/23/2020  Authorization Period Expiration: 10-21-21  Plan of Care Certification Period: 12-30-20  Visit # / Visits authorized: 1/ 1    Time In: 10:40 am  Time Out: 11:20 am  Total Billable Time: 40 minutes    Precautions: Standard    Subjective     Date of surgery: 12-20-19  PROCEDURES(S) PERFORMED:   1. Left  Arthroscopy, anterior cruciate ligament reconstruction 34681   2. Left  Extra-Articular ligament reconstruction (ALL), 69296  3.  Left  Arthroscopy, with meniscus repair (medial OR lateral) 66131, Lateral  4.  Left  Arthroscopy, knee, synovectomy,      2-11-20  PROCEDURES(S) PERFORMED:   1. Left  patellar tendon repair, complex (reconstruction)  2.  Left  Arthroscopy, knee, synovectomy, major 41537  3.  Left  Arthroscopy, debridement/shaving of articular cartilage (chondroplasty      History of current condition - Ba reports: he stopped coming to PT in June after deciding he no longer wanted to play sports but has since changed his mind.  States he hasn't been exercising for 4 months.  He c/o L quad weakness and of posterior knee pain after rising from prolonged sitting.  States he lacks full knee extension.       Past Medical History:   Diagnosis Date    Asthma      Ba Celestin  has a past surgical history that includes Appendectomy; Tonsillectomy; Fixation of syndesmosis of ankle (Right, 10/30/2018); Knee arthroscopy w/ ACL reconstruction (Left, 12/20/2019); Reconstruction of ligament (Left, 12/20/2019); Repair of meniscus of knee (Left, 12/20/2019); Arthroscopic chondroplasty of knee joint (Left, 12/20/2019); Synovectomy of knee (Left,  12/20/2019); Patellar tendon repair (Left, 2/11/2020); and Knee arthroscopy w/ debridement (2/11/2020).    Ba has a current medication list which includes the following prescription(s): aspirin, celecoxib, loratadine, neomycin-polymyxin-hydrocortisone, oxycodone-acetaminophen, promethazine, and sulfamethoxazole-trimethoprim 800-160mg.    Review of patient's allergies indicates:   Allergen Reactions    Penicillins Rash        Imaging: na    Prior Therapy: post-op PT  Social History:  lives with their family  Occupation: student  Prior Level of Function: wrestling; football  Current Level of Function: independent with ADL    Pain:  Current 0/10, worst 7/10, best 0/10   Location: left knee   Description: Aching  Aggravating Factors: Sitting  Easing Factors: rest    Pts goals: return to sports    Objective     Postural examination:  obese     Functional assessment:   - walking:   Independent with knee flexion gait noted             AROM:  -10 deg ext (0 deg after stretching) and 120 deg flexion (120 R)     MMT:   4-/5 quads, 4/5 hip abductors and 5/5 hams    Tone:  Decreased quads; FAIR QS ability; mild lag with SLR    Flexibility testing:   tight hams    Special tests:   Neg Lachman and varus/valgus stresses    Palpation:   No TTP    Joint mobility: good    Swelling:  MIN    Other:  Incision well healed; unable to perform a SL squat        TREATMENT     Treatment Time In: 10:40 am  Treatment Time Out: 11:20 am  Total Treatment time separate from Evaluation time:  25 min    Treatment:  HSS  HSS with 2 5# wts and ankle on bolster (2 x 3 min)  HEP    Home Exercises and Patient Education Provided    Education provided:   - ice for pain    Written Home Exercises Provided: yes.  Exercises were reviewed and Ba was able to demonstrate them prior to the end of the session.  Ba demonstrated good  understanding of the education provided.     See EMR under Media for exercises provided 10/23/2020.      Assessment      Ba is a 19 y.o. male referred to outpatient Physical Therapy with a medical diagnosis of S86.812S (ICD-10-CM) - Patellar tendon rupture   .  Pt presents with L LE weakness with decreased knee extension.    Pt prognosis is Good.   Pt will benefit from skilled outpatient Physical Therapy to address the deficits stated above and in the chart below, provide pt/family education, and to maximize pt's level of independence.     Plan of care discussed with patient: Yes  Pt's spiritual, cultural and educational needs considered and patient is agreeable to the plan of care and goals as stated below:     Anticipated Barriers for therapy: none    Medical Necessity is demonstrated by the following:  History  Co-morbidities and personal factors that may impact the plan of care Co-morbidities:   none    Personal Factors:   no deficits     low   Examination  Body Structures and Functions, activity limitations and participation restrictions that may impact the plan of care Body Regions:   lower extremities    Body Systems:    gross symmetry  ROM  strength  gross coordinated movement  balance    Participation Restrictions:   none    Activity limitations:   Learning and applying knowledge  no deficits    General Tasks and Commands  no deficits    Communication  no deficits    Mobility  lifting and carrying objects    Self care  no deficits    Domestic Life  no deficits    Interactions/Relationships  no deficits    Life Areas  no deficits    Community and Social Life  no deficits         low   Clinical Presentation stable and uncomplicated low   Decision Making/ Complexity Score: low     Goals:  Short Term Goals: 2 weeks         1.   Independent with HEP        2.  Pt will report decreased pain level of < 50% from above measure with ADL    Long Term Goals:   GOALS:    8_   weeks. Pt agrees with goals set.  1. Independent with HEP.  2. Report decreased    L knee    pain  <   / =  2/10 with ADL such as stair  climbing  3. Increased MMT  for  L LE to 4+/5 to 5/5  with ADL such as lifting  4. Increased arom  for  L knee to 0-125 deg flexion with functional activities such walking or jogging      Plan     Certification Period/Plan of care expiration: 10/23/2020 to 12-30-20.    Outpatient Physical Therapy 2 times weekly for 8 weeks to include the following interventions: Manual Therapy, Moist Heat/ Ice, Neuromuscular Re-ed, Patient Education and Therapeutic Exercise.     Juan Prieto, PT

## 2020-10-23 NOTE — PATIENT INSTRUCTIONS
ROM: Heel Prop    Lie with pillow under right heel. Tighten the muscles on the top of leg while trying to push knee toward the floor. Hold 1-5 minutes for a cumulative total of 30 minutes per day.       [email or web address]       Strengthening: Quadriceps Set    Tighten muscles on top of thighs by pushing knees down into surface. Hold 5 seconds.  Repeat 25 times . R/L or BL  leg per set. Do 1 sets per session. Do 2 sessions per day.      Straight Leg Raise     With R/L or BL leg straight, other leg bent, raise straight leg until knees are even. Slowly lower. Roll on your side and repeat lifting top leg up, on other side, lifting bottom leg up, and on stomach kicking back (squeezing your rear end before you kick back).  Repeat 25 times. Do 1 sets per session. Do 2 sessions per day.       Glute Squeeze, supine    Lie face up and squeeze your rear end. Do not tighten your abdominals or hold your breath. Squeeze our glutes and hold 5 seconds. Relax.  Repeat 25 times per set. Do 1 sets per session. Do 2 sessions per day.      Strengthening: Hip Adduction - Isometric    Sit back or lie down with ball or folded pillow between knees, and squeeze knees together. Hold 5 seconds.  Repeat 25 times per set. Do 1 sets per session. Do 2 sessions per day.      Strengthening: Hip Abductor - Resisted    Lie flat with band looped around both legs above knees, and push thighs apart, ensuring right and left move together.  Repeat 25 times per set. Do 1 sets per session. Do 2 sessions per day.      Clam Shells    Lie on side with knees bent. Raise top leg, keeping knee bent and ankles together. Do not let your hips roll back as your raise your leg.  Repeat 25 times. Do 1 sets per session. Do 2 sessions per day.      Hamstring Stretch    Perform 5 times, 30 sec hold 2x day - (HEP to go)      Hamstring Curl        Perform 25 times, 2x per day. (HEP to GO)

## 2020-11-04 ENCOUNTER — DOCUMENTATION ONLY (OUTPATIENT)
Dept: REHABILITATION | Facility: HOSPITAL | Age: 20
End: 2020-11-04

## 2020-11-04 NOTE — PROGRESS NOTES
Ba arrived 30' late for scheduled tx today. Instructed to re schedule due to 15' time limit.     Sushant Patricia PTA, STS

## 2020-11-06 ENCOUNTER — CLINICAL SUPPORT (OUTPATIENT)
Dept: REHABILITATION | Facility: HOSPITAL | Age: 20
End: 2020-11-06
Attending: ORTHOPAEDIC SURGERY
Payer: COMMERCIAL

## 2020-11-06 DIAGNOSIS — R53.1 WEAKNESS: ICD-10-CM

## 2020-11-06 PROCEDURE — 97110 THERAPEUTIC EXERCISES: CPT | Performed by: PHYSICAL THERAPIST

## 2020-11-06 NOTE — PROGRESS NOTES
Physical Therapy Daily Treatment Note     Name: Ba Celestin  Clinic Number: [id number]    Therapy Diagnosis:   Encounter Diagnosis   Name Primary?    Weakness      Physician: Grant Membreno MD    Visit Date: 11/6/2020  Physician Orders: PT Eval and Treat   Medical Diagnosis: S86.812S (ICD-10-CM) - Patellar tendon rupture   Evaluation Date: 10/23/2020  Authorization Period Expiration: 10-21-21  Plan of Care Certification Period: 12-30-20  Visit # / Visits authorized: 2/ 20    Time In: 12:00 pm  Time Out: 12:55 pm  Total Billable Time: 45 minutes    Precautions: Standard    Subjective     Pt reports: L knee feels OK..  He was compliant with home exercise program.  Response to previous treatment: na  Functional change: na    Pain: 0/10  Location: left knee      Objective     -8 deg ext.    Ba received therapeutic exercises to develop strength, endurance, ROM and flexibility for 45 minutes including:  Bike x 8 min  Pat MOBS/PROM x 5 min  Knee ext stretch on chair x 4 min with 2 5# wts  Leg press with 180#  SL press with 120#  MH abd and TKE with 3/5 plates  BOSU lateral lunge  High box steps  Ball bridges  Ball sit ups  Sled push, 2 laps  Rise from chair  TRX squats  GTB side steps  HEP review  CP x 10 min      Home Exercises Provided and Patient Education Provided     Education provided:   - ice for pain    Written Home Exercises Provided: Patient instructed to cont prior HEP.  Exercises were reviewed and Ba was able to demonstrate them prior to the end of the session.  Ba demonstrated good  understanding of the education provided.     See EMR under Media for exercises provided prior visit.    Assessment     Tolerated exercises well with fatigue noted.  Ba is progressing well towards his goals.   Pt prognosis is Good.     Pt will continue to benefit from skilled outpatient physical therapy to address the deficits listed in the problem list box on initial evaluation, provide pt/family education  and to maximize pt's level of independence in the home and community environment.     Pt's spiritual, cultural and educational needs considered and pt agreeable to plan of care and goals.    Anticipated barriers to physical therapy: none    Goals: Short Term Goals: 2 weeks         1.   Independent with HEP        2.  Pt will report decreased pain level of < 50% from above measure with ADL     Long Term Goals:   GOALS:    8_   weeks. Pt agrees with goals set.  1. Independent with HEP.  2. Report decreased    L knee    pain  <   / =  2/10 with ADL such as stair climbing  3. Increased MMT  for  L LE to 4+/5 to 5/5  with ADL such as lifting  4. Increased arom  for  L knee to 0-125 deg flexion with functional activities such walking or jogging      Plan     Continue PT per POC.    Juan Prieto, PT

## 2020-11-13 ENCOUNTER — CLINICAL SUPPORT (OUTPATIENT)
Dept: REHABILITATION | Facility: HOSPITAL | Age: 20
End: 2020-11-13
Attending: ORTHOPAEDIC SURGERY
Payer: COMMERCIAL

## 2020-11-13 DIAGNOSIS — R53.1 WEAKNESS: ICD-10-CM

## 2020-11-13 PROCEDURE — 97110 THERAPEUTIC EXERCISES: CPT | Performed by: PHYSICAL THERAPIST

## 2020-11-13 NOTE — PROGRESS NOTES
Physical Therapy Daily Treatment Note     Name: Ba Celestin  Clinic Number: [id number]    Therapy Diagnosis:   Encounter Diagnosis   Name Primary?    Weakness      Physician: Grant Membreno MD    Visit Date: 11/13/2020  Physician Orders: PT Eval and Treat   Medical Diagnosis: S86.812S (ICD-10-CM) - Patellar tendon rupture   Evaluation Date: 10/23/2020  Authorization Period Expiration: 10-21-21  Plan of Care Certification Period: 12-30-20  Visit # / Visits authorized: 3/ 20    Time In: 7:50 am  Time Out: 8:50 am  Total Billable Time: 50 minutes    Precautions: Standard    Subjective     Pt reports: L knee feels good.  He was compliant with home exercise program.  Response to previous treatment: na  Functional change: na    Pain: 0/10  Location: left knee      Objective     -4 deg ext.  Lag noted with SLR.    Ba received therapeutic exercises to develop strength, endurance, ROM and flexibility for 45 minutes including:  Bike x 8 min  Pat MOBS/PROM x 5 min  Knee ext stretch on chair x 6 min with 2 5# wts  Leg press with 180#  SL press with 120#  MH abd and TKE with 3/5 plates  BOSU lateral lunge  High box steps  Ball bridges  Ball sit ups  Sled push, 2 laps  Rise from chair  TRX squats  GTB side steps  SLR  HEP review  CP x 10 min      Home Exercises Provided and Patient Education Provided     Education provided:   - ice for pain    Written Home Exercises Provided: Patient instructed to cont prior HEP.  Exercises were reviewed and Ba was able to demonstrate them prior to the end of the session.  Ba demonstrated good  understanding of the education provided.     See EMR under Media for exercises provided prior visit.    Assessment     Tolerated exercises well.  Unable tp perform SLR without a lag.  Ba is progressing well towards his goals.   Pt prognosis is Good.     Pt will continue to benefit from skilled outpatient physical therapy to address the deficits listed in the problem list box on  initial evaluation, provide pt/family education and to maximize pt's level of independence in the home and community environment.     Pt's spiritual, cultural and educational needs considered and pt agreeable to plan of care and goals.    Anticipated barriers to physical therapy: none    Goals: Short Term Goals: 2 weeks         1.   Independent with HEP        2.  Pt will report decreased pain level of < 50% from above measure with ADL     Long Term Goals:   GOALS:    8_   weeks. Pt agrees with goals set.  1. Independent with HEP.  2. Report decreased    L knee    pain  <   / =  2/10 with ADL such as stair climbing  3. Increased MMT  for  L LE to 4+/5 to 5/5  with ADL such as lifting  4. Increased arom  for  L knee to 0-125 deg flexion with functional activities such walking or jogging      Plan     Continue PT per POC.    Juan Prieto, PT

## 2020-11-17 ENCOUNTER — CLINICAL SUPPORT (OUTPATIENT)
Dept: REHABILITATION | Facility: HOSPITAL | Age: 20
End: 2020-11-17
Attending: ORTHOPAEDIC SURGERY
Payer: COMMERCIAL

## 2020-11-17 DIAGNOSIS — R53.1 WEAKNESS: ICD-10-CM

## 2020-11-17 PROCEDURE — 97110 THERAPEUTIC EXERCISES: CPT | Performed by: PHYSICAL THERAPIST

## 2020-11-17 NOTE — PROGRESS NOTES
Physical Therapy Daily Treatment Note     Name: Ba Celestin  Clinic Number: [id number]    Therapy Diagnosis:   Encounter Diagnosis   Name Primary?    Weakness      Physician: Grant Membreno MD    Visit Date: 11/17/2020  Physician Orders: PT Eval and Treat   Medical Diagnosis: S86.812S (ICD-10-CM) - Patellar tendon rupture   Evaluation Date: 10/23/2020  Authorization Period Expiration: 10-21-21  Plan of Care Certification Period: 12-30-20  Visit # / Visits authorized: 4/ 20    Time In: 6:50 am  Time Out: 7:50 am  Total Billable Time: 50 minutes    Precautions: Standard    Subjective     Pt reports: L knee feels good.  He was compliant with home exercise program.  Response to previous treatment: less pain  Functional change: improved gait pattern    Pain: 0/10  Location: left knee      Objective     -3 deg ext.  MIN lag noted with SLR.    Ba received therapeutic exercises to develop strength, endurance, ROM and flexibility for 45 minutes including:  Bike x 8 min  Pat MOBS/PROM x 5 min  Knee ext stretch on chair x 6 min with 2 5# wts  Leg press with 200#  SL press with 120#  MH abd and TKE with 3/5 plates  BOSU lateral lunge  High box steps with 10# medicine ball  Ball bridges  Ball sit ups  Sled push, 2 laps  Rise from chair  TRX squats  GTB side steps  SLR  HEP review  CP x 10 min      Home Exercises Provided and Patient Education Provided     Education provided:   - ice for pain    Written Home Exercises Provided: Patient instructed to cont prior HEP.  Exercises were reviewed and Ba was able to demonstrate them prior to the end of the session.  Ba demonstrated good  understanding of the education provided.     See EMR under Media for exercises provided prior visit.    Assessment     Tolerated exercises well and without c/o.  SLR improved.  Ba is progressing well towards his goals.   Pt prognosis is Good.     Pt will continue to benefit from skilled outpatient physical therapy to address the  deficits listed in the problem list box on initial evaluation, provide pt/family education and to maximize pt's level of independence in the home and community environment.     Pt's spiritual, cultural and educational needs considered and pt agreeable to plan of care and goals.    Anticipated barriers to physical therapy: none    Goals: Short Term Goals: 2 weeks         1.   Independent with HEP        2.  Pt will report decreased pain level of < 50% from above measure with ADL     Long Term Goals:   GOALS:    8_   weeks. Pt agrees with goals set.  1. Independent with HEP.  2. Report decreased    L knee    pain  <   / =  2/10 with ADL such as stair climbing  3. Increased MMT  for  L LE to 4+/5 to 5/5  with ADL such as lifting  4. Increased arom  for  L knee to 0-125 deg flexion with functional activities such walking or jogging      Plan     Continue PT per POC.    Juan Prieto, PT

## 2020-12-01 ENCOUNTER — CLINICAL SUPPORT (OUTPATIENT)
Dept: REHABILITATION | Facility: HOSPITAL | Age: 20
End: 2020-12-01
Attending: ORTHOPAEDIC SURGERY
Payer: COMMERCIAL

## 2020-12-01 DIAGNOSIS — R53.1 WEAKNESS: ICD-10-CM

## 2020-12-01 PROCEDURE — 97110 THERAPEUTIC EXERCISES: CPT | Performed by: PHYSICAL THERAPIST

## 2020-12-01 NOTE — PROGRESS NOTES
Physical Therapy Daily Treatment Note     Name: Ba Celestin  Clinic Number: [id number]    Therapy Diagnosis:   Encounter Diagnosis   Name Primary?    Weakness      Physician: Grant Membreno MD    Visit Date: 12/1/2020  Physician Orders: PT Eval and Treat   Medical Diagnosis: S86.812S (ICD-10-CM) - Patellar tendon rupture   Evaluation Date: 10/23/2020  Authorization Period Expiration: 10-21-21  Plan of Care Certification Period: 12-30-20  Visit # / Visits authorized: 5/ 20    Time In: 6:50 am  Time Out: 7:45 am  Total Billable Time: 45 minutes    Precautions: Standard    Subjective     Pt reports: L knee feels good.  States not doing any exercises at home.  He was not compliant with home exercise program.  Response to previous treatment: less pain  Functional change: improved gait pattern    Pain: 0/10  Location: left knee      Objective     -3 deg ext.  MIN lag noted with SLR.  QS ability increased.    Ba received therapeutic exercises to develop strength, endurance, ROM and flexibility for 45 minutes including:  Bike x 8 min  Pat MOBS/PROM x 5 min  Knee ext stretch on chair x 6 min with 2 5# wts  Leg press with 200#  SL press with 120#  MH abd and TKE with 3/5 plates  BOSU lateral lunge  High box steps with 10# medicine ball  Ball bridges  Ball sit ups  Sled push, 2 laps  Rise from chair  TRX squats  GTB side steps  SLR  HEP review (stressed importance)  CP x 10 min      Home Exercises Provided and Patient Education Provided     Education provided:   - ice for pain    Written Home Exercises Provided: Patient instructed to cont prior HEP.  Exercises were reviewed and Ba was able to demonstrate them prior to the end of the session.  Ba demonstrated good  understanding of the education provided.     See EMR under Media for exercises provided prior visit.    Assessment     Tolerated exercises well and without c/o.  Needs to do HEP.  Ba is progressing well towards his goals.   Pt prognosis is  Good.     Pt will continue to benefit from skilled outpatient physical therapy to address the deficits listed in the problem list box on initial evaluation, provide pt/family education and to maximize pt's level of independence in the home and community environment.     Pt's spiritual, cultural and educational needs considered and pt agreeable to plan of care and goals.    Anticipated barriers to physical therapy: none    Goals: Short Term Goals: 2 weeks         1.   Independent with HEP        2.  Pt will report decreased pain level of < 50% from above measure with ADL     Long Term Goals:   GOALS:    8_   weeks. Pt agrees with goals set.  1. Independent with HEP.  2. Report decreased    L knee    pain  <   / =  2/10 with ADL such as stair climbing  3. Increased MMT  for  L LE to 4+/5 to 5/5  with ADL such as lifting  4. Increased arom  for  L knee to 0-125 deg flexion with functional activities such walking or jogging      Plan     Continue PT per POC.    Juan Prieto, PT

## 2020-12-02 ENCOUNTER — OFFICE VISIT (OUTPATIENT)
Dept: SPORTS MEDICINE | Facility: CLINIC | Age: 20
End: 2020-12-02
Payer: COMMERCIAL

## 2020-12-02 VITALS — HEIGHT: 70 IN | BODY MASS INDEX: 42.95 KG/M2 | WEIGHT: 300 LBS

## 2020-12-02 DIAGNOSIS — Z98.890 S/P ACL RECONSTRUCTION: ICD-10-CM

## 2020-12-02 DIAGNOSIS — S86.812S PATELLAR TENDON RUPTURE, LEFT, SEQUELA: ICD-10-CM

## 2020-12-02 DIAGNOSIS — S83.512S RUPTURE OF ANTERIOR CRUCIATE LIGAMENT OF LEFT KNEE, SEQUELA: ICD-10-CM

## 2020-12-02 DIAGNOSIS — Z98.890 S/P LEFT KNEE ARTHROSCOPY: Primary | ICD-10-CM

## 2020-12-02 PROBLEM — T81.31XA RUPTURE OF OPERATION WOUND: Status: RESOLVED | Noted: 2020-03-02 | Resolved: 2020-12-02

## 2020-12-02 PROCEDURE — 99999 PR PBB SHADOW E&M-EST. PATIENT-LVL IV: CPT | Mod: PBBFAC,,, | Performed by: ORTHOPAEDIC SURGERY

## 2020-12-02 PROCEDURE — 99213 OFFICE O/P EST LOW 20 MIN: CPT | Mod: S$GLB,,, | Performed by: ORTHOPAEDIC SURGERY

## 2020-12-02 PROCEDURE — 99999 PR PBB SHADOW E&M-EST. PATIENT-LVL IV: ICD-10-PCS | Mod: PBBFAC,,, | Performed by: ORTHOPAEDIC SURGERY

## 2020-12-02 PROCEDURE — 99213 PR OFFICE/OUTPT VISIT, EST, LEVL III, 20-29 MIN: ICD-10-PCS | Mod: S$GLB,,, | Performed by: ORTHOPAEDIC SURGERY

## 2020-12-02 PROCEDURE — 1126F AMNT PAIN NOTED NONE PRSNT: CPT | Mod: S$GLB,,, | Performed by: ORTHOPAEDIC SURGERY

## 2020-12-02 PROCEDURE — 3008F PR BODY MASS INDEX (BMI) DOCUMENTED: ICD-10-PCS | Mod: CPTII,S$GLB,, | Performed by: ORTHOPAEDIC SURGERY

## 2020-12-02 PROCEDURE — 3008F BODY MASS INDEX DOCD: CPT | Mod: CPTII,S$GLB,, | Performed by: ORTHOPAEDIC SURGERY

## 2020-12-02 PROCEDURE — 1126F PR PAIN SEVERITY QUANTIFIED, NO PAIN PRESENT: ICD-10-PCS | Mod: S$GLB,,, | Performed by: ORTHOPAEDIC SURGERY

## 2020-12-02 NOTE — PROGRESS NOTES
Subjective:          Chief Complaint: Ba Celestin is a 19 y.o. male who had concerns including Pain of the Left Knee.    Ba Celestin  is a pleasant 19 y.o. y/o Male who is here 10 mo post-op appointment.  Patient state he has been doing relatively well since surgery although he is lacking knee extension. He is having trouble with maintaining active extension in particular.     MEDICATIONS: none    He is still using her TENS and PolarCare daily for pain control.    THERAPY: Gopal with GARFIELD Prieto, once a week     He/She denies any nausea/vomiting, fever/chills, or discharge from her incisions.      Previous HPI: 10/21/20  Pt presents post op visit s/p below. 0/10 pain today. He has stopped physical therapy for past two months. He feels that his strength and ROM are lacking.   He is no longer taking any medications. He cannot lunge or do body weight exercises.         DATE OF PROCEDURE: 2/11/2020     ATTENDING SURGEON: Surgeon(s) and Role:     * Grant Membreno MD - Primary     ASSISTANTS:  Walker Huitron MD-Resident  Jacy Whittaker PA-C - Assistant     PREOPERATIVE DIAGNOSIS:  Left  Internal derangement knee M23.90, Tear, Lateral meniscus, acute S83.289A and complex patellar tendon repair     POSTOPERATIVE DIAGNOSIS:   Left  Internal derangement knee M23.90, Synovitis M65.9 and Complex patellar tendon tear     PROCEDURES(S) PERFORMED:   1. Left  patellar tendon repair, complex (reconstruction)  2.  Left  Arthroscopy, knee, synovectomy, major 25846  3.  Left  Arthroscopy, debridement/shaving of articular cartilage (chondroplasty) 77497     DATE OF PROCEDURE: 12/20/2019     ATTENDING SURGEON: Surgeon(s) and Role:     * Grant Membreno MD - Primary     Assistants:  MD Celestino Villa PA-C        PREOPERATIVE DIAGNOSIS:  Left  Synovitis M65.9, Tear, Lateral meniscus, acute S83.289A and Anterior Cruciate Ligament Tear S83.510     POSTOPERATIVE DIAGNOSIS:   Left  Synovitis M65.9, Tear, Lateral  meniscus, acute S83.289A and Anterior Cruciate Ligament Tear S83.510     PROCEDURES(S) PERFORMED:   1. Left  Arthroscopy, anterior cruciate ligament reconstruction 52827   2. Left  Extra-Articular ligament reconstruction (ALL), 45633  3.  Left  Arthroscopy, with meniscus repair (medial OR lateral) 04455, Lateral  4.  Left  Arthroscopy, knee, synovectomy, limited 35278             Review of Systems   Constitution: Negative. Negative for chills, fever, weight gain and weight loss.   HENT: Negative.  Negative for congestion and sore throat.    Eyes: Negative.  Negative for blurred vision and double vision.   Cardiovascular: Negative.  Negative for chest pain, leg swelling and palpitations.   Respiratory: Negative.  Negative for cough and shortness of breath.    Endocrine: Negative.    Hematologic/Lymphatic: Negative.  Does not bruise/bleed easily.   Skin: Positive for color change. Negative for itching, poor wound healing and rash.   Musculoskeletal: Positive for joint pain, joint swelling and stiffness. Negative for back pain, muscle weakness and myalgias.   Gastrointestinal: Negative.  Negative for abdominal pain, constipation, diarrhea, nausea and vomiting.   Genitourinary: Negative.  Negative for frequency and hematuria.   Neurological: Negative.  Negative for dizziness, headaches, numbness, paresthesias and sensory change.   Psychiatric/Behavioral: Negative.  Negative for altered mental status and depression. The patient is not nervous/anxious.    Allergic/Immunologic: Negative.  Negative for hives.                   Objective:        General: Ba is well-developed, well-nourished, appears stated age, in no acute distress, alert and oriented to time, place and person.     General    Vitals reviewed.  Constitutional: He is oriented to person, place, and time. He appears well-developed and well-nourished. No distress.   HENT:   Mouth/Throat: No oropharyngeal exudate.   Eyes: Right eye exhibits no discharge. Left  eye exhibits no discharge.   Neck: Normal range of motion.   Cardiovascular: Normal rate and regular rhythm.    Pulmonary/Chest: Effort normal and breath sounds normal. No respiratory distress.   Neurological: He is alert and oriented to person, place, and time. He has normal reflexes. No cranial nerve deficit. Coordination normal.   Psychiatric: He has a normal mood and affect. His behavior is normal. Judgment and thought content normal.     General Musculoskeletal Exam   Gait: antalgic and normal       Right Knee Exam     Inspection   Erythema: absent  Scars: absent  Swelling: absent  Effusion: absent  Deformity: absent  Bruising: absent    Tenderness   The patient is experiencing no tenderness.     Range of Motion   Extension: 0   Flexion: 130     Tests   Meniscus   Lynn:  Medial - negative Lateral - negative  Ligament Examination Lachman: normal (-1 to 2mm) PCL-Posterior Drawer: normal (0 to 2mm)     MCL - Valgus: normal (0 to 2mm)  LCL - Varus: normalPivot Shift: normal (Equal)Reverse Pivot Shift: normal (Equal)Dial Test at 30 degrees: normal (< 5 degrees)Dial Test at 90 degrees: normal (< 5 degrees)  Posterior Sag Test: negative  Posterolateral Corner: unstable (>15 degrees difference)  Patella   Patellar apprehension: negative  Passive Patellar Tilt: neutral  Patellar Tracking: normal  Patellar Glide (quadrants): Lateral - 1   Medial - 2  Q-Angle at 90 degrees: normal  Patellar Grind: negative  J-Sign: none    Other   Meniscal Cyst: absent  Popliteal (Baker's) Cyst: absent  Sensation: normal    Left Knee Exam     Inspection   Erythema: absent  Scars: present  Swelling: absent  Effusion: absent  Deformity: absent  Bruising: absent    Tenderness   The patient is experiencing no tenderness.     Range of Motion   Extension:  -5 (LAG 5 degrees) abnormal   Flexion:  130 (125) normal     Tests   Meniscus   Lynn:  Medial - negative Lateral - negative  Stability Lachman: normal (-1 to 2mm) PCL-Posterior  Drawer: normal (0 to 2mm)  MCL - Valgus: normal (0 to 2mm)  LCL - Varus: normal (0 to 2mm)Pivot Shift: normal (Equal)Reverse Pivot Shift: normal (Equal)Dial Test at 30 degrees: normal (< 5 degrees)Dial Test at 90 degrees: normal (< 5 degrees)  Posterior Sag Test: negative  Posterolateral Corner: unstable (>15 degrees difference)  Patella   Patellar apprehension: positive  Passive Patellar Tilt: neutral  Patellar Tracking: normal  Patellar Glide (Quadrants): Lateral - 1 Medial - 2  Q-Angle at 90 degrees: normal  Patellar Grind: negative  J-Sign: J sign absent    Other   Meniscal Cyst: absent  Popliteal (Baker's) Cyst: absent  Sensation: normal    Comments:  Small amount of bloody drainage from very distal incision.    Right Hip Exam     Tests   Enid: negative  Left Hip Exam     Tests   Enid: negative          Muscle Strength   Right Lower Extremity   Hip Abduction: 5/5   Quadriceps:  4/5   Hamstrin/5   Left Lower Extremity   Hip Abduction: 5/5   Quadriceps:  4/5   Hamstrin/5     Reflexes     Left Side  Achilles:  2+  Quadriceps:  2+    Right Side   Achilles:  2+  Quadriceps:  2+    Vascular Exam     Right Pulses  Dorsalis Pedis:      2+  Posterior Tibial:      2+        Left Pulses  Dorsalis Pedis:      2+  Posterior Tibial:      2+          Radiographic Findings:    Postoperative changes are again identified relating to a prior anterior cruciate ligament reconstruction procedure.  The patella now lies in normal relationship with the femur following the repair of the patellar tendon rupture documented on the MR exam of 02/10/2020.  No evidence of superimposed recent fracture, lytic destructive process, or other detrimental change since the preoperative exam of 02/10/2020 noted.  Infrapatellar soft tissue swelling has decreased since that time.    Xrays of the left knee were reviewed by me today. These findings were discussed and reviewed with the patient.    XRays done on 10/21/2020        Assessment:        Encounter Diagnoses   Name Primary?    S/P left knee arthroscopy Yes    S/P ACL reconstruction     Rupture of anterior cruciate ligament of left knee, sequela     Patellar tendon rupture, left, sequela           Plan:       1. IKDC, SF-12 and KOOS was not filled out today in clinic.     RTC in 3 months with Dr. Grant Membreno .  Patient will not fill out IKDC, SF-12, KOOS / bilateral knee series on return.     2.  Full AAROM/PROM, CORE program, Exercycle. Goal improve Functional quadriceps control    3.  Celebrex 200 mg BID recommended to decrease the stiffness    4. Resume PT with Juan Prieto   - Full WBAT  Weight reduction from 290 lbs. To 250 lbs. For return to wrestling/football  CORE and gluteal program  Full AAROM/PROM, CORE program, Exercycle. Goal improve Functional quadriceps control    5. Nutritional Consult placed but too expensive so he will do this on his own.    6. Performance Strength and conditional consult     Has 5-10 degree LAG needs to improve                    Patient questionnaires may have been collected.

## 2020-12-08 ENCOUNTER — CLINICAL SUPPORT (OUTPATIENT)
Dept: REHABILITATION | Facility: HOSPITAL | Age: 20
End: 2020-12-08
Attending: ORTHOPAEDIC SURGERY
Payer: COMMERCIAL

## 2020-12-08 DIAGNOSIS — R53.1 WEAKNESS: ICD-10-CM

## 2020-12-08 PROCEDURE — 97110 THERAPEUTIC EXERCISES: CPT | Performed by: PHYSICAL THERAPIST

## 2020-12-08 NOTE — PROGRESS NOTES
Physical Therapy Daily Treatment Note     Name: Ba Celestin  Clinic Number: [id number]    Therapy Diagnosis:   Encounter Diagnosis   Name Primary?    Weakness      Physician: Grant Membreno MD    Visit Date: 12/8/2020  Physician Orders: PT Eval and Treat   Medical Diagnosis: S86.812S (ICD-10-CM) - Patellar tendon rupture   Evaluation Date: 10/23/2020  Authorization Period Expiration: 10-21-21  Plan of Care Certification Period: 12-30-20  Visit # / Visits authorized: 6/ 20    Time In: 4:20 pm  Time Out: 5:20 pm  Total Billable Time: 50 minutes    Precautions: Standard    Subjective     Pt reports: L knee feels good.    He was not compliant with home exercise program.  Response to previous treatment: less pain  Functional change: improved gait pattern    Pain: 0/10  Location: left knee      Objective     -3 deg ext.  MIN lag noted with SLR.  QS ability increased. MIN swelling.    Ba received therapeutic exercises to develop strength, endurance, ROM and flexibility for 45 minutes including:  Bike x 8 min  Pat MOBS/PROM x 5 min  Knee ext stretch on chair x 6 min with 2 5# wts  Leg press with 200#  SL press with 120#  MH abd and TKE with 3/5 plates  BOSU lateral lunge  High box steps with 10# medicine ball  Ball bridges  Ball sit ups  Sled push, 2 laps  Rise from chair  TRX squats  GTB side steps  SL squat in runner's pose  SLR  HEP review   CP x 10 min      Home Exercises Provided and Patient Education Provided     Education provided:   - ice for pain    Written Home Exercises Provided: Patient instructed to cont prior HEP.  Exercises were reviewed and Ba was able to demonstrate them prior to the end of the session.  Ba demonstrated good  understanding of the education provided.     See EMR under Media for exercises provided prior visit.    Assessment     Tolerated exercises well and without c/o.  Still lacking TKE.  Ba is progressing well towards his goals.   Pt prognosis is Good.     Pt will  continue to benefit from skilled outpatient physical therapy to address the deficits listed in the problem list box on initial evaluation, provide pt/family education and to maximize pt's level of independence in the home and community environment.     Pt's spiritual, cultural and educational needs considered and pt agreeable to plan of care and goals.    Anticipated barriers to physical therapy: none    Goals: Short Term Goals: 2 weeks         1.   Independent with HEP        2.  Pt will report decreased pain level of < 50% from above measure with ADL     Long Term Goals:   GOALS:    8_   weeks. Pt agrees with goals set.  1. Independent with HEP.  2. Report decreased    L knee    pain  <   / =  2/10 with ADL such as stair climbing  3. Increased MMT  for  L LE to 4+/5 to 5/5  with ADL such as lifting  4. Increased arom  for  L knee to 0-125 deg flexion with functional activities such walking or jogging      Plan     Continue PT per POC.    Juan Prieto, PT

## 2021-10-03 ENCOUNTER — CLINICAL SUPPORT (OUTPATIENT)
Dept: URGENT CARE | Facility: CLINIC | Age: 21
End: 2021-10-03
Payer: COMMERCIAL

## 2021-10-03 DIAGNOSIS — Z11.52 ENCOUNTER FOR SCREENING LABORATORY TESTING FOR COVID-19 VIRUS: Primary | ICD-10-CM

## 2021-10-03 LAB
CTP QC/QA: YES
SARS-COV-2 RDRP RESP QL NAA+PROBE: NEGATIVE

## 2021-10-03 PROCEDURE — U0002: ICD-10-PCS | Mod: QW,S$GLB,, | Performed by: NURSE PRACTITIONER

## 2021-10-03 PROCEDURE — U0002 COVID-19 LAB TEST NON-CDC: HCPCS | Mod: QW,S$GLB,, | Performed by: NURSE PRACTITIONER

## 2021-11-08 ENCOUNTER — OFFICE VISIT (OUTPATIENT)
Dept: ORTHOPEDICS | Facility: CLINIC | Age: 21
End: 2021-11-08
Payer: COMMERCIAL

## 2021-11-08 ENCOUNTER — HOSPITAL ENCOUNTER (OUTPATIENT)
Dept: RADIOLOGY | Facility: HOSPITAL | Age: 21
Discharge: HOME | End: 2021-11-08
Attending: PHYSICIAN ASSISTANT
Payer: COMMERCIAL

## 2021-11-08 VITALS
SYSTOLIC BLOOD PRESSURE: 150 MMHG | HEIGHT: 70 IN | BODY MASS INDEX: 45.1 KG/M2 | HEART RATE: 84 BPM | WEIGHT: 315 LBS | DIASTOLIC BLOOD PRESSURE: 81 MMHG

## 2021-11-08 DIAGNOSIS — M25.512 LEFT SHOULDER PAIN, UNSPECIFIED CHRONICITY: Primary | ICD-10-CM

## 2021-11-08 DIAGNOSIS — M75.42 IMPINGEMENT SYNDROME OF LEFT SHOULDER: Primary | ICD-10-CM

## 2021-11-08 DIAGNOSIS — M75.22 BICEPS TENDINITIS OF LEFT SHOULDER: ICD-10-CM

## 2021-11-08 DIAGNOSIS — M25.512 LEFT SHOULDER PAIN, UNSPECIFIED CHRONICITY: ICD-10-CM

## 2021-11-08 PROCEDURE — 1159F MED LIST DOCD IN RCRD: CPT | Mod: CPTII,S$GLB,, | Performed by: PHYSICIAN ASSISTANT

## 2021-11-08 PROCEDURE — 99203 OFFICE O/P NEW LOW 30 MIN: CPT | Mod: S$GLB,,, | Performed by: PHYSICIAN ASSISTANT

## 2021-11-08 PROCEDURE — 3077F PR MOST RECENT SYSTOLIC BLOOD PRESSURE >= 140 MM HG: ICD-10-PCS | Mod: CPTII,S$GLB,, | Performed by: PHYSICIAN ASSISTANT

## 2021-11-08 PROCEDURE — 73030 XR SHOULDER COMPLETE 2 OR MORE VIEWS LEFT: ICD-10-PCS | Mod: 26,LT,, | Performed by: RADIOLOGY

## 2021-11-08 PROCEDURE — 3079F PR MOST RECENT DIASTOLIC BLOOD PRESSURE 80-89 MM HG: ICD-10-PCS | Mod: CPTII,S$GLB,, | Performed by: PHYSICIAN ASSISTANT

## 2021-11-08 PROCEDURE — 73030 X-RAY EXAM OF SHOULDER: CPT | Mod: TC,FY,LT

## 2021-11-08 PROCEDURE — 3008F PR BODY MASS INDEX (BMI) DOCUMENTED: ICD-10-PCS | Mod: CPTII,S$GLB,, | Performed by: PHYSICIAN ASSISTANT

## 2021-11-08 PROCEDURE — 1160F RVW MEDS BY RX/DR IN RCRD: CPT | Mod: CPTII,S$GLB,, | Performed by: PHYSICIAN ASSISTANT

## 2021-11-08 PROCEDURE — 99999 PR PBB SHADOW E&M-EST. PATIENT-LVL III: ICD-10-PCS | Mod: PBBFAC,,, | Performed by: PHYSICIAN ASSISTANT

## 2021-11-08 PROCEDURE — 99203 PR OFFICE/OUTPT VISIT, NEW, LEVL III, 30-44 MIN: ICD-10-PCS | Mod: S$GLB,,, | Performed by: PHYSICIAN ASSISTANT

## 2021-11-08 PROCEDURE — 3008F BODY MASS INDEX DOCD: CPT | Mod: CPTII,S$GLB,, | Performed by: PHYSICIAN ASSISTANT

## 2021-11-08 PROCEDURE — 1159F PR MEDICATION LIST DOCUMENTED IN MEDICAL RECORD: ICD-10-PCS | Mod: CPTII,S$GLB,, | Performed by: PHYSICIAN ASSISTANT

## 2021-11-08 PROCEDURE — 3079F DIAST BP 80-89 MM HG: CPT | Mod: CPTII,S$GLB,, | Performed by: PHYSICIAN ASSISTANT

## 2021-11-08 PROCEDURE — 99999 PR PBB SHADOW E&M-EST. PATIENT-LVL III: CPT | Mod: PBBFAC,,, | Performed by: PHYSICIAN ASSISTANT

## 2021-11-08 PROCEDURE — 1160F PR REVIEW ALL MEDS BY PRESCRIBER/CLIN PHARMACIST DOCUMENTED: ICD-10-PCS | Mod: CPTII,S$GLB,, | Performed by: PHYSICIAN ASSISTANT

## 2021-11-08 PROCEDURE — 3077F SYST BP >= 140 MM HG: CPT | Mod: CPTII,S$GLB,, | Performed by: PHYSICIAN ASSISTANT

## 2021-11-08 PROCEDURE — 73030 X-RAY EXAM OF SHOULDER: CPT | Mod: 26,LT,, | Performed by: RADIOLOGY

## 2021-11-09 ENCOUNTER — CLINICAL SUPPORT (OUTPATIENT)
Dept: REHABILITATION | Facility: HOSPITAL | Age: 21
End: 2021-11-09
Payer: COMMERCIAL

## 2021-11-09 DIAGNOSIS — M79.602 PAIN OF LEFT UPPER EXTREMITY: ICD-10-CM

## 2021-11-09 DIAGNOSIS — M75.22 BICEPS TENDINITIS OF LEFT SHOULDER: ICD-10-CM

## 2021-11-09 DIAGNOSIS — Z74.09 STIFFNESS DUE TO IMMOBILITY: ICD-10-CM

## 2021-11-09 DIAGNOSIS — M25.60 STIFFNESS DUE TO IMMOBILITY: ICD-10-CM

## 2021-11-09 DIAGNOSIS — M75.42 IMPINGEMENT SYNDROME OF LEFT SHOULDER: ICD-10-CM

## 2021-11-09 PROCEDURE — 97110 THERAPEUTIC EXERCISES: CPT | Mod: PN

## 2021-11-09 PROCEDURE — 97165 OT EVAL LOW COMPLEX 30 MIN: CPT | Mod: PN

## 2022-05-03 ENCOUNTER — TELEPHONE (OUTPATIENT)
Dept: SPORTS MEDICINE | Facility: CLINIC | Age: 22
End: 2022-05-03
Payer: COMMERCIAL

## 2022-05-03 NOTE — TELEPHONE ENCOUNTER
----- Message from Rashmi Haynes sent at 5/3/2022  9:38 AM CDT -----  Contact: self @ [phone]  Please get him scheduled  Rashmi Haynes   Clinical Assistant to Dr. Grant Membreno   ----- Message -----  From: Roselyn Alvarez  Sent: 5/3/2022   8:51 AM CDT  To: Haseeb SCHMIDT Staff    Pt is calling to schedule a f/u appt for his left knee.  There is nothing available.  He was last seen .  Pls call with an appt.

## 2022-12-14 NOTE — PROGRESS NOTES
Subjective:          Chief Complaint: Ba Celestin is a 19 y.o. male who had concerns including Pain of the Left Knee.    HPI   Patient presents to clinic for 2 week post op evaluation of left knee. Pain today is 0/10. Denies nausea, vomiting, fever, chills, CP, and SOB. He is no longer taking pain medication. He is currently ambulating with T-scope brace in place and set to 90 degrees. He is 25% WB with crutches. He has been attending formal PT at [facility] with Sav Ibarra. Patient is leaving to go back to college in a week.      DATE OF PROCEDURE: 12/20/2019     ATTENDING SURGEON: Surgeon(s) and Role:     * Grant Membreno MD - Primary     Assistants:  MD Celestino Villa PA-C        PREOPERATIVE DIAGNOSIS:  Left  Synovitis M65.9, Tear, Lateral meniscus, acute S83.289A and Anterior Cruciate Ligament Tear S83.510     POSTOPERATIVE DIAGNOSIS:   Left  Synovitis M65.9, Tear, Lateral meniscus, acute S83.289A and Anterior Cruciate Ligament Tear S83.510     PROCEDURES(S) PERFORMED:   1. Left  Arthroscopy, anterior cruciate ligament reconstruction 32881   2. Left  Extra-Articular ligament reconstruction (ALL), 62788  3.  Left  Arthroscopy, with meniscus repair (medial OR lateral) 53305, Lateral  4.  Left  Arthroscopy, knee, synovectomy, limited 69657    Review of Systems   Constitution: Negative. Negative for chills, fever, weight gain and weight loss.   HENT: Negative for congestion and sore throat.    Eyes: Negative for blurred vision and double vision.   Cardiovascular: Negative for chest pain, leg swelling and palpitations.   Respiratory: Negative for cough and shortness of breath.    Hematologic/Lymphatic: Does not bruise/bleed easily.   Skin: Negative for itching, poor wound healing and rash.   Musculoskeletal: Positive for joint pain, joint swelling and stiffness. Negative for back pain, muscle weakness and myalgias.   Gastrointestinal: Negative for abdominal pain, constipation, diarrhea,  nausea and vomiting.   Genitourinary: Negative.  Negative for frequency and hematuria.   Neurological: Negative for dizziness, headaches, numbness, paresthesias and sensory change.   Psychiatric/Behavioral: Negative for altered mental status and depression. The patient is not nervous/anxious.    Allergic/Immunologic: Negative for hives.                   Objective:        General: Ba is well-developed, well-nourished, appears stated age, in no acute distress, alert and oriented to time, place and person.     General    Vitals reviewed.  Constitutional: He is oriented to person, place, and time. He appears well-developed and well-nourished. No distress.   HENT:   Mouth/Throat: No oropharyngeal exudate.   Eyes: Right eye exhibits no discharge. Left eye exhibits no discharge.   Neck: Normal range of motion.   Cardiovascular: Normal rate and regular rhythm.    Pulmonary/Chest: Effort normal and breath sounds normal. No respiratory distress.   Neurological: He is alert and oriented to person, place, and time. He has normal reflexes. No cranial nerve deficit. Coordination normal.   Psychiatric: He has a normal mood and affect. His behavior is normal. Judgment and thought content normal.     General Musculoskeletal Exam   Gait: antalgic and abnormal       Right Knee Exam     Inspection   Erythema: absent  Scars: absent  Swelling: absent  Effusion: absent  Deformity: absent  Bruising: absent    Tenderness   The patient is experiencing no tenderness.     Range of Motion   Extension: 0   Flexion: 140     Tests   Meniscus   Lynn:  Medial - negative Lateral - negative  Ligament Examination Lachman: normal (-1 to 2mm) PCL-Posterior Drawer: normal (0 to 2mm)     MCL - Valgus: normal (0 to 2mm)  LCL - Varus: normalPivot Shift: normal (Equal)Reverse Pivot Shift: normal (Equal)Dial Test at 30 degrees: normal (< 5 degrees)Dial Test at 90 degrees: normal (< 5 degrees)  Posterior Sag Test: negative  Posterolateral Corner:  Sub-Acute rehab unstable (>15 degrees difference)  Patella   Patellar apprehension: negative  Passive Patellar Tilt: neutral  Patellar Tracking: normal  Patellar Glide (quadrants): Lateral - 1   Medial - 2  Q-Angle at 90 degrees: normal  Patellar Grind: negative  J-Sign: none    Other   Meniscal Cyst: absent  Popliteal (Baker's) Cyst: absent  Sensation: normal    Left Knee Exam     Inspection   Erythema: absent  Scars: present  Swelling: present  Effusion: absent  Deformity: absent  Bruising: absent    Tenderness   The patient tender to palpation of the patellar tendon.    Range of Motion   Extension:  0 normal   Flexion:  70 abnormal     Tests   Meniscus   Lynn:  Medial - negative Lateral - negative  Stability Lachman: normal (-1 to 2mm) PCL-Posterior Drawer: normal (0 to 2mm)  MCL - Valgus: normal (0 to 2mm)  LCL - Varus: normal (0 to 2mm)Pivot Shift: normal (Equal)Reverse Pivot Shift: normal (Equal)Dial Test at 30 degrees: normal (< 5 degrees)Dial Test at 90 degrees: normal (< 5 degrees)  Posterior Sag Test: negative  Posterolateral Corner: unstable (>15 degrees difference)  Patella   Patellar apprehension: negative  Passive Patellar Tilt: neutral  Patellar Tracking: normal  Patellar Glide (Quadrants): Lateral - 1 Medial - 2  Q-Angle at 90 degrees: normal  Patellar Grind: negative  J-Sign: J sign absent    Other   Meniscal Cyst: absent  Popliteal (Baker's) Cyst: absent  Sensation: normal    Comments:  Incision healing well. No signs of infection or necrosis. No purulent drainage. NVI.    Right Hip Exam     Tests   Enid: negative  Left Hip Exam     Tests   Enid: negative          Muscle Strength   Right Lower Extremity   Hip Abduction: 5/5   Quadriceps:  4/5   Hamstrin/5   Left Lower Extremity   Hip Abduction: 5/5   Quadriceps:  5/5   Hamstrin/5     Reflexes     Left Side  Quadriceps:  2+  Achilles:  2+    Right Side   Quadriceps:  2+  Achilles:  2+    Vascular Exam     Right Pulses  Dorsalis Pedis:       2+  Posterior Tibial:      2+        Left Pulses  Dorsalis Pedis:      2+  Posterior Tibial:      2+          RADIOGRAPHS:  Left knee:  FINDINGS:  Postoperative changes are now identified relating to an interval left anterior cruciate ligament reconstruction procedure.  Osseous structures are otherwise unremarkable.    No evidence of recent fracture or lytic destructive process.  No unusual postoperative findings or significant detrimental interval change since the preoperative examination of 12/18/2019 appreciated.      Assessment:       Encounter Diagnoses   Name Primary?    Rupture of anterior cruciate ligament of left knee, subsequent encounter Yes    Acute pain of left knee     Status post reconstruction of anterior cruciate ligament           Plan:       1. IKDC, SF-12 and KOOS was filled out today in clinic.     RTC in 6 weeks with Dr. Grant Membreno Patient will fill out IKDC, SF-12 and KOOS and bilateral knee series on return.    2. Continue  mg once a day.  4 Continue PT per protocol.  5. RTC in 1 weeks with Dr. Grant Membreno for 3week post op appt. Prior to patient leaving to go back to school.   6. PHYSICAL THERAPY:  25 % PWB x 2 weeks then 50% PWB x 1 week then WBAT at 4-6 weeks after surgery    ROM:  AAROM /PROM 0-90 degrees now then at 4 weeks 100 degrees and 6 weeks 120 degrees as tolerated  Avoid forced flexion for 3 months    Discontinue immobilizer at 6 weeks    No running or jumping for 3-4 months    Low impact x 4 months    Advance CORE program at 4-6 weeks                Patient questionnaires may have been collected.

## (undated) DEVICE — COVER CAMERA OPERATING ROOM

## (undated) DEVICE — SEE MEDLINE ITEM 152530

## (undated) DEVICE — DRAPE PLASTIC U 60X72

## (undated) DEVICE — UNDERGLOVE BIOGEL PI SZ 6.5 LF

## (undated) DEVICE — SWABSTICK BENZOIN 4 IN

## (undated) DEVICE — SEE MEDLINE ITEM 157150

## (undated) DEVICE — PIN GUIDE GRAFT PASS XACTPIN
Type: IMPLANTABLE DEVICE | Site: KNEE | Status: NON-FUNCTIONAL
Removed: 2019-12-20

## (undated) DEVICE — GLOVE BIOGEL SKINSENSE PI 7.0

## (undated) DEVICE — DRAPE STERI INSTRUMENT 1018

## (undated) DEVICE — SEE MEDLINE ITEM 146231

## (undated) DEVICE — PAD KNEE POLAR XL

## (undated) DEVICE — DRAPE INCISE IOBAN 2 23X17IN

## (undated) DEVICE — ELECTRODE REM PLYHSV RETURN 9

## (undated) DEVICE — Device

## (undated) DEVICE — SUT VICRYL+ 1 CT1 18IN

## (undated) DEVICE — SUT VICRYL CT-1 2-0 27IN

## (undated) DEVICE — BLADE 4.2MM PREBENT ULTRACUT

## (undated) DEVICE — SUT VICRYL 1 CT-1 27 UNDIE

## (undated) DEVICE — APPLICATOR CHLORAPREP ORN 26ML

## (undated) DEVICE — TRAY ARTHROSCOPY 2/CS

## (undated) DEVICE — BANDAGE ACE DOUBLE STER 6IN

## (undated) DEVICE — DRAPE STERI U-SHAPED 47X51IN

## (undated) DEVICE — PAD ELECTRODE STER 1.5X3

## (undated) DEVICE — SEE MEDLINE ITEM 146298

## (undated) DEVICE — SEE MEDLINE ITEM 156930

## (undated) DEVICE — PAD ABDOMINAL 5X9 STERILE

## (undated) DEVICE — SEE MEDLINE ITEM 153151

## (undated) DEVICE — DRESSING AQUACEL AG ADV 3.5X12

## (undated) DEVICE — SEE MEDLINE ITEM 146313

## (undated) DEVICE — ACL DISPOSABLE KIT

## (undated) DEVICE — PAD COLD THERAPY KNEE WRAP ON

## (undated) DEVICE — GLOVE BIOGEL SKINSENSE PI 6.5

## (undated) DEVICE — SEE MEDLINE ITEM 146308

## (undated) DEVICE — SEE MEDLINE ITEM 152622

## (undated) DEVICE — SEE MEDLINE ITEM 146345

## (undated) DEVICE — SUT VICRYL CTD 2-0 GI 27 SH

## (undated) DEVICE — NDL 22GA X1 1/2 REG BEVEL

## (undated) DEVICE — BLADE SURG CARBON STEEL SZ11

## (undated) DEVICE — BIT DRILL SENTINEL 10.5MMX9IN

## (undated) DEVICE — MARKER SKIN STND TIP BLUE BARR

## (undated) DEVICE — NDL SPINAL 20GX3.5 HUB

## (undated) DEVICE — REAMER LOW PROFILE 7MM

## (undated) DEVICE — SEE MEDLINE ITEM 152529

## (undated) DEVICE — SUT VICRYL PLUS 3-0 SH 18IN

## (undated) DEVICE — SEE MEDLINE ITEM 157169

## (undated) DEVICE — DRAPE C-ARMOR EQUIPMENT COVER

## (undated) DEVICE — BANDAGE ACE ELASTIC 6"

## (undated) DEVICE — DRESSING XEROFORM 1X8IN

## (undated) DEVICE — BANDAGE ESMARK 6X12

## (undated) DEVICE — RETRIEVER SUTURE HEWSON DISP

## (undated) DEVICE — BRACE KNEE T SCOPE PREMIER

## (undated) DEVICE — ELECTRODE 90 DEGREE ANGLE

## (undated) DEVICE — SUT MCRYL PLUS 4-0 PS2 27IN

## (undated) DEVICE — SOL IRR NACL .9% 3000ML

## (undated) DEVICE — BLADE SURG #15 CARBON STEEL

## (undated) DEVICE — PAD ABD 8X10 STERILE

## (undated) DEVICE — NDL MAYO 2

## (undated) DEVICE — SUT 4-0 ETHILON 18 PS-2

## (undated) DEVICE — SHAVER ULTRAFFR 4.2MM

## (undated) DEVICE — UNDERGLOVES BIOGEL PI SIZE 7.5

## (undated) DEVICE — SEE MEDLINE ITEM 157117

## (undated) DEVICE — SYS PRINEO SKIN CLOSURE

## (undated) DEVICE — PENCIL ELECTROSURG HOLST W/BLD

## (undated) DEVICE — CLOSURE SKIN STERI STRIP 1/2X4

## (undated) DEVICE — DRAPE EMERALD 87X114.75X113

## (undated) DEVICE — BLADE ULTRACUT 5.5

## (undated) DEVICE — NDL SPINAL 18GX3.5 SPINOCAN

## (undated) DEVICE — PAD CAST SPECIALIST STRL 4

## (undated) DEVICE — DRESSING XEROFORM FOIL PK 1X8

## (undated) DEVICE — ADHESIVE DERMABOND ADVANCED

## (undated) DEVICE — SHAVER SYS 5.5 ULRAFRR

## (undated) DEVICE — TOURNIQUET SB QC DP 34X4IN

## (undated) DEVICE — SPONGE LAP 18X18 PREWASHED

## (undated) DEVICE — GAUZE SPONGE 4X4 12PLY

## (undated) DEVICE — SEE MEDLINE ITEM 157216

## (undated) DEVICE — ELECTRODES DISPOSABLE

## (undated) DEVICE — GLOVE BIOGEL SKINSENSE PI 8.5

## (undated) DEVICE — TOURNIQUET SB QC SP 34X4IN

## (undated) DEVICE — PAD CAST SPECIALIST STRL 6

## (undated) DEVICE — ELECTRODE VAPR S 90 4.0MM

## (undated) DEVICE — SOL 9P NACL IRR PIC IL

## (undated) DEVICE — PUMP COLD THERAPY

## (undated) DEVICE — SEE MEDLINE ITEM 157131

## (undated) DEVICE — GOWN SMART IMP BREATHABLE XXLG

## (undated) DEVICE — ORTHOCORD W/OS-6

## (undated) DEVICE — SUT PDS VIL/BLU DUAL ORTHO

## (undated) DEVICE — SYR 30CC LUER LOCK

## (undated) DEVICE — PAD UNDERPAD 30X30

## (undated) DEVICE — PADDING CAST 6IN DELTA ROLL

## (undated) DEVICE — TRAY MINOR ORTHO

## (undated) DEVICE — NDL SURGEON MAYO #4

## (undated) DEVICE — SEE MEDLINE ITEM 146347

## (undated) DEVICE — SPLINT PLASTER FAST SET 5X30IN

## (undated) DEVICE — TOURNIQUET SB QC DP 44X4IN

## (undated) DEVICE — SUT ETHILON 4-0 PS2 18 BLK

## (undated) DEVICE — SET DECANTER MEDICHOICE

## (undated) DEVICE — GOWN B1 X-LG X-LONG

## (undated) DEVICE — DRESSING AQUACEL AG 3.5X10IN

## (undated) DEVICE — TUBE SET INFLOW/OUTFLOW

## (undated) DEVICE — SYR ONLY LUER LOCK 20CC